# Patient Record
Sex: FEMALE | Race: OTHER | Employment: UNEMPLOYED | URBAN - NONMETROPOLITAN AREA
[De-identification: names, ages, dates, MRNs, and addresses within clinical notes are randomized per-mention and may not be internally consistent; named-entity substitution may affect disease eponyms.]

---

## 2020-02-03 ENCOUNTER — APPOINTMENT (OUTPATIENT)
Dept: CT IMAGING | Age: 55
End: 2020-02-03
Payer: MEDICARE

## 2020-02-03 ENCOUNTER — APPOINTMENT (OUTPATIENT)
Dept: GENERAL RADIOLOGY | Age: 55
End: 2020-02-03
Payer: MEDICARE

## 2020-02-03 ENCOUNTER — HOSPITAL ENCOUNTER (EMERGENCY)
Age: 55
Discharge: ANOTHER HOSPITAL | End: 2020-02-04
Attending: EMERGENCY MEDICINE
Payer: MEDICARE

## 2020-02-03 PROBLEM — G45.9 TIA (TRANSIENT ISCHEMIC ATTACK): Status: ACTIVE | Noted: 2020-02-03

## 2020-02-03 LAB
-: ABNORMAL
ABSOLUTE EOS #: 0.24 K/UL (ref 0–0.44)
ABSOLUTE IMMATURE GRANULOCYTE: 0.05 K/UL (ref 0–0.3)
ABSOLUTE LYMPH #: 4.83 K/UL (ref 1.1–3.7)
ABSOLUTE MONO #: 0.64 K/UL (ref 0.1–1.2)
ALBUMIN SERPL-MCNC: 4.3 G/DL (ref 3.5–5.2)
ALBUMIN/GLOBULIN RATIO: 1.4 (ref 1–2.5)
ALP BLD-CCNC: 96 U/L (ref 35–104)
ALT SERPL-CCNC: 21 U/L (ref 5–33)
AMORPHOUS: ABNORMAL
ANION GAP SERPL CALCULATED.3IONS-SCNC: 16 MMOL/L (ref 9–17)
AST SERPL-CCNC: 15 U/L
BACTERIA: ABNORMAL
BASOPHILS # BLD: 1 % (ref 0–2)
BASOPHILS ABSOLUTE: 0.07 K/UL (ref 0–0.2)
BILIRUB SERPL-MCNC: 0.33 MG/DL (ref 0.3–1.2)
BILIRUBIN URINE: NEGATIVE
BUN BLDV-MCNC: 13 MG/DL (ref 6–20)
BUN/CREAT BLD: 23 (ref 9–20)
CALCIUM SERPL-MCNC: 9.4 MG/DL (ref 8.6–10.4)
CASTS UA: ABNORMAL /LPF (ref 0–2)
CHLORIDE BLD-SCNC: 94 MMOL/L (ref 98–107)
CO2: 22 MMOL/L (ref 20–31)
COLOR: ABNORMAL
COMMENT UA: ABNORMAL
CREAT SERPL-MCNC: 0.56 MG/DL (ref 0.5–0.9)
CRYSTALS, UA: ABNORMAL /HPF
DIFFERENTIAL TYPE: ABNORMAL
EOSINOPHILS RELATIVE PERCENT: 2 % (ref 1–4)
EPITHELIAL CELLS UA: ABNORMAL /HPF (ref 0–5)
GFR AFRICAN AMERICAN: >60 ML/MIN
GFR NON-AFRICAN AMERICAN: >60 ML/MIN
GFR SERPL CREATININE-BSD FRML MDRD: ABNORMAL ML/MIN/{1.73_M2}
GFR SERPL CREATININE-BSD FRML MDRD: ABNORMAL ML/MIN/{1.73_M2}
GLUCOSE BLD-MCNC: 259 MG/DL (ref 70–99)
GLUCOSE URINE: ABNORMAL
HCT VFR BLD CALC: 41.4 % (ref 36.3–47.1)
HEMOGLOBIN: 13.6 G/DL (ref 11.9–15.1)
IMMATURE GRANULOCYTES: 1 %
INR BLD: 1
KETONES, URINE: ABNORMAL
LEUKOCYTE ESTERASE, URINE: NEGATIVE
LYMPHOCYTES # BLD: 48 % (ref 24–43)
MAGNESIUM: 1.6 MG/DL (ref 1.6–2.6)
MCH RBC QN AUTO: 28.5 PG (ref 25.2–33.5)
MCHC RBC AUTO-ENTMCNC: 32.9 G/DL (ref 25.2–33.5)
MCV RBC AUTO: 86.8 FL (ref 82.6–102.9)
MONOCYTES # BLD: 6 % (ref 3–12)
MUCUS: ABNORMAL
NITRITE, URINE: NEGATIVE
NRBC AUTOMATED: 0 PER 100 WBC
OTHER OBSERVATIONS UA: ABNORMAL
PARTIAL THROMBOPLASTIN TIME: 24.5 SEC (ref 27–35)
PDW BLD-RTO: 13 % (ref 11.8–14.4)
PH UA: 5.5 (ref 5–6)
PLATELET # BLD: 239 K/UL (ref 138–453)
PLATELET ESTIMATE: ABNORMAL
PMV BLD AUTO: 11.6 FL (ref 8.1–13.5)
POTASSIUM SERPL-SCNC: 4.4 MMOL/L (ref 3.7–5.3)
PROTEIN UA: NEGATIVE
PROTHROMBIN TIME: 10 SEC (ref 9.4–11.3)
RBC # BLD: 4.77 M/UL (ref 3.95–5.11)
RBC # BLD: ABNORMAL 10*6/UL
RBC UA: ABNORMAL /HPF (ref 0–4)
RENAL EPITHELIAL, UA: ABNORMAL /HPF
SEG NEUTROPHILS: 43 % (ref 36–65)
SEGMENTED NEUTROPHILS ABSOLUTE COUNT: 4.34 K/UL (ref 1.5–8.1)
SODIUM BLD-SCNC: 132 MMOL/L (ref 135–144)
SPECIFIC GRAVITY UA: 1.03 (ref 1.01–1.02)
TOTAL PROTEIN: 7.4 G/DL (ref 6.4–8.3)
TRICHOMONAS: ABNORMAL
TROPONIN INTERP: NORMAL
TROPONIN T: NORMAL NG/ML
TROPONIN, HIGH SENSITIVITY: 7 NG/L (ref 0–14)
TURBIDITY: ABNORMAL
URINE HGB: NEGATIVE
UROBILINOGEN, URINE: NORMAL
WBC # BLD: 10.2 K/UL (ref 3.5–11.3)
WBC # BLD: ABNORMAL 10*3/UL
WBC UA: ABNORMAL /HPF (ref 0–4)
YEAST: ABNORMAL

## 2020-02-03 PROCEDURE — 80053 COMPREHEN METABOLIC PANEL: CPT

## 2020-02-03 PROCEDURE — 84484 ASSAY OF TROPONIN QUANT: CPT

## 2020-02-03 PROCEDURE — 85730 THROMBOPLASTIN TIME PARTIAL: CPT

## 2020-02-03 PROCEDURE — 71046 X-RAY EXAM CHEST 2 VIEWS: CPT

## 2020-02-03 PROCEDURE — 93005 ELECTROCARDIOGRAM TRACING: CPT | Performed by: EMERGENCY MEDICINE

## 2020-02-03 PROCEDURE — 81001 URINALYSIS AUTO W/SCOPE: CPT

## 2020-02-03 PROCEDURE — 85025 COMPLETE CBC W/AUTO DIFF WBC: CPT

## 2020-02-03 PROCEDURE — 87086 URINE CULTURE/COLONY COUNT: CPT

## 2020-02-03 PROCEDURE — 83735 ASSAY OF MAGNESIUM: CPT

## 2020-02-03 PROCEDURE — 70450 CT HEAD/BRAIN W/O DYE: CPT

## 2020-02-03 PROCEDURE — 99285 EMERGENCY DEPT VISIT HI MDM: CPT

## 2020-02-03 PROCEDURE — 2580000003 HC RX 258: Performed by: EMERGENCY MEDICINE

## 2020-02-03 PROCEDURE — 85610 PROTHROMBIN TIME: CPT

## 2020-02-03 RX ORDER — 0.9 % SODIUM CHLORIDE 0.9 %
1000 INTRAVENOUS SOLUTION INTRAVENOUS ONCE
Status: COMPLETED | OUTPATIENT
Start: 2020-02-03 | End: 2020-02-03

## 2020-02-03 RX ADMIN — SODIUM CHLORIDE 1000 ML: 9 INJECTION, SOLUTION INTRAVENOUS at 20:05

## 2020-02-03 SDOH — HEALTH STABILITY: MENTAL HEALTH: HOW OFTEN DO YOU HAVE A DRINK CONTAINING ALCOHOL?: NEVER

## 2020-02-04 ENCOUNTER — HOSPITAL ENCOUNTER (INPATIENT)
Age: 55
LOS: 1 days | Discharge: HOME | DRG: 880 | End: 2020-02-05
Attending: INTERNAL MEDICINE | Admitting: INTERNAL MEDICINE
Payer: MEDICARE

## 2020-02-04 ENCOUNTER — APPOINTMENT (OUTPATIENT)
Dept: CT IMAGING | Age: 55
DRG: 880 | End: 2020-02-04
Attending: INTERNAL MEDICINE
Payer: MEDICARE

## 2020-02-04 VITALS
SYSTOLIC BLOOD PRESSURE: 125 MMHG | BODY MASS INDEX: 35.81 KG/M2 | WEIGHT: 166 LBS | TEMPERATURE: 97.8 F | DIASTOLIC BLOOD PRESSURE: 76 MMHG | RESPIRATION RATE: 16 BRPM | HEART RATE: 93 BPM | HEIGHT: 57 IN | OXYGEN SATURATION: 96 %

## 2020-02-04 LAB
EKG ATRIAL RATE: 60 BPM
EKG P AXIS: 69 DEGREES
EKG P-R INTERVAL: 174 MS
EKG Q-T INTERVAL: 434 MS
EKG QRS DURATION: 140 MS
EKG QTC CALCULATION (BAZETT): 434 MS
EKG R AXIS: -38 DEGREES
EKG T AXIS: 48 DEGREES
EKG VENTRICULAR RATE: 60 BPM
GLUCOSE BLD-MCNC: 239 MG/DL (ref 65–105)
GLUCOSE BLD-MCNC: 299 MG/DL (ref 65–105)
GLUCOSE BLD-MCNC: 347 MG/DL (ref 65–105)

## 2020-02-04 PROCEDURE — 70498 CT ANGIOGRAPHY NECK: CPT

## 2020-02-04 PROCEDURE — 2060000000 HC ICU INTERMEDIATE R&B

## 2020-02-04 PROCEDURE — 6370000000 HC RX 637 (ALT 250 FOR IP): Performed by: INTERNAL MEDICINE

## 2020-02-04 PROCEDURE — 6360000004 HC RX CONTRAST MEDICATION: Performed by: INTERNAL MEDICINE

## 2020-02-04 PROCEDURE — 6370000000 HC RX 637 (ALT 250 FOR IP): Performed by: NURSE PRACTITIONER

## 2020-02-04 PROCEDURE — 92523 SPEECH SOUND LANG COMPREHEN: CPT

## 2020-02-04 PROCEDURE — 99232 SBSQ HOSP IP/OBS MODERATE 35: CPT | Performed by: INTERNAL MEDICINE

## 2020-02-04 PROCEDURE — 82947 ASSAY GLUCOSE BLOOD QUANT: CPT

## 2020-02-04 PROCEDURE — 97161 PT EVAL LOW COMPLEX 20 MIN: CPT

## 2020-02-04 PROCEDURE — 2580000003 HC RX 258: Performed by: NURSE PRACTITIONER

## 2020-02-04 PROCEDURE — 95819 EEG AWAKE AND ASLEEP: CPT | Performed by: PSYCHIATRY & NEUROLOGY

## 2020-02-04 PROCEDURE — 6360000002 HC RX W HCPCS: Performed by: NURSE PRACTITIONER

## 2020-02-04 PROCEDURE — 99222 1ST HOSP IP/OBS MODERATE 55: CPT

## 2020-02-04 PROCEDURE — 97530 THERAPEUTIC ACTIVITIES: CPT

## 2020-02-04 PROCEDURE — 95816 EEG AWAKE AND DROWSY: CPT

## 2020-02-04 RX ORDER — SODIUM CHLORIDE 0.9 % (FLUSH) 0.9 %
10 SYRINGE (ML) INJECTION EVERY 12 HOURS SCHEDULED
Status: DISCONTINUED | OUTPATIENT
Start: 2020-02-04 | End: 2020-02-05 | Stop reason: HOSPADM

## 2020-02-04 RX ORDER — ASPIRIN 325 MG
TABLET ORAL
COMMUNITY
Start: 2012-10-03

## 2020-02-04 RX ORDER — ONDANSETRON 2 MG/ML
4 INJECTION INTRAMUSCULAR; INTRAVENOUS EVERY 6 HOURS PRN
Status: DISCONTINUED | OUTPATIENT
Start: 2020-02-04 | End: 2020-02-05 | Stop reason: HOSPADM

## 2020-02-04 RX ORDER — FUROSEMIDE 80 MG
1 TABLET ORAL DAILY
COMMUNITY
Start: 2012-10-03

## 2020-02-04 RX ORDER — MONTELUKAST SODIUM 10 MG/1
10 TABLET ORAL NIGHTLY
COMMUNITY

## 2020-02-04 RX ORDER — DIGOXIN 125 MCG
1 TABLET ORAL DAILY
Status: ON HOLD | COMMUNITY
Start: 2012-10-03 | End: 2020-02-04

## 2020-02-04 RX ORDER — CALCIUM CARBONATE/VITAMIN D3 500-10/5ML
LIQUID (ML) ORAL 2 TIMES DAILY
COMMUNITY
Start: 2016-08-08

## 2020-02-04 RX ORDER — RANOLAZINE 500 MG/1
500 TABLET, EXTENDED RELEASE ORAL 2 TIMES DAILY
COMMUNITY
Start: 2019-10-11

## 2020-02-04 RX ORDER — ISOSORBIDE MONONITRATE 120 MG/1
120 TABLET, EXTENDED RELEASE ORAL DAILY
COMMUNITY
Start: 2019-10-11

## 2020-02-04 RX ORDER — ALBUTEROL SULFATE 90 UG/1
2 AEROSOL, METERED RESPIRATORY (INHALATION) DAILY PRN
COMMUNITY
Start: 2018-04-23

## 2020-02-04 RX ORDER — GABAPENTIN 300 MG/1
300 CAPSULE ORAL DAILY
COMMUNITY
Start: 2016-08-08

## 2020-02-04 RX ORDER — KETOCONAZOLE 20 MG/G
CREAM TOPICAL 2 TIMES DAILY
COMMUNITY
Start: 2019-04-03

## 2020-02-04 RX ORDER — ROPINIROLE 0.25 MG/1
0.25 TABLET, FILM COATED ORAL NIGHTLY
COMMUNITY
Start: 2016-08-08

## 2020-02-04 RX ORDER — GLIPIZIDE 10 MG/1
1 TABLET, FILM COATED, EXTENDED RELEASE ORAL DAILY
COMMUNITY
Start: 2019-04-01

## 2020-02-04 RX ORDER — ALBUTEROL SULFATE 2.5 MG/3ML
2.5 SOLUTION RESPIRATORY (INHALATION) EVERY 4 HOURS
COMMUNITY
Start: 2012-10-03

## 2020-02-04 RX ORDER — SODIUM CHLORIDE 0.9 % (FLUSH) 0.9 %
10 SYRINGE (ML) INJECTION PRN
Status: DISCONTINUED | OUTPATIENT
Start: 2020-02-04 | End: 2020-02-05 | Stop reason: HOSPADM

## 2020-02-04 RX ORDER — ATORVASTATIN CALCIUM 40 MG/1
40 TABLET, FILM COATED ORAL NIGHTLY
Status: DISCONTINUED | OUTPATIENT
Start: 2020-02-04 | End: 2020-02-05 | Stop reason: HOSPADM

## 2020-02-04 RX ORDER — ACETAMINOPHEN 325 MG/1
650 TABLET ORAL EVERY 4 HOURS PRN
Status: DISCONTINUED | OUTPATIENT
Start: 2020-02-04 | End: 2020-02-05 | Stop reason: HOSPADM

## 2020-02-04 RX ORDER — ERGOCALCIFEROL 1.25 MG/1
1 CAPSULE ORAL
COMMUNITY
Start: 2019-02-15

## 2020-02-04 RX ORDER — POTASSIUM CHLORIDE 20 MEQ/1
20 TABLET, EXTENDED RELEASE ORAL DAILY
COMMUNITY
Start: 2019-10-11

## 2020-02-04 RX ORDER — NITROGLYCERIN 0.4 MG/1
0.4 TABLET SUBLINGUAL EVERY 5 MIN PRN
COMMUNITY
Start: 2012-10-03

## 2020-02-04 RX ORDER — DIGOXIN 250 MCG
1 TABLET ORAL DAILY
COMMUNITY
Start: 2019-04-01

## 2020-02-04 RX ORDER — NICOTINE POLACRILEX 4 MG
15 LOZENGE BUCCAL PRN
Status: DISCONTINUED | OUTPATIENT
Start: 2020-02-04 | End: 2020-02-05 | Stop reason: HOSPADM

## 2020-02-04 RX ORDER — CLOPIDOGREL BISULFATE 75 MG/1
75 TABLET ORAL DAILY
Status: DISCONTINUED | OUTPATIENT
Start: 2020-02-04 | End: 2020-02-05 | Stop reason: HOSPADM

## 2020-02-04 RX ORDER — RANOLAZINE 500 MG/1
500 TABLET, EXTENDED RELEASE ORAL 2 TIMES DAILY
Status: DISCONTINUED | OUTPATIENT
Start: 2020-02-04 | End: 2020-02-05 | Stop reason: HOSPADM

## 2020-02-04 RX ORDER — OMEPRAZOLE 40 MG/1
40 CAPSULE, DELAYED RELEASE ORAL 2 TIMES DAILY
COMMUNITY
Start: 2019-01-09

## 2020-02-04 RX ORDER — DEXTROSE MONOHYDRATE 50 MG/ML
100 INJECTION, SOLUTION INTRAVENOUS PRN
Status: DISCONTINUED | OUTPATIENT
Start: 2020-02-04 | End: 2020-02-05 | Stop reason: HOSPADM

## 2020-02-04 RX ORDER — DEXTROSE MONOHYDRATE 25 G/50ML
12.5 INJECTION, SOLUTION INTRAVENOUS PRN
Status: DISCONTINUED | OUTPATIENT
Start: 2020-02-04 | End: 2020-02-05 | Stop reason: HOSPADM

## 2020-02-04 RX ADMIN — INSULIN LISPRO 8 UNITS: 100 INJECTION, SOLUTION INTRAVENOUS; SUBCUTANEOUS at 15:44

## 2020-02-04 RX ADMIN — DESMOPRESSIN ACETATE 40 MG: 0.2 TABLET ORAL at 21:05

## 2020-02-04 RX ADMIN — IOHEXOL 90 ML: 350 INJECTION, SOLUTION INTRAVENOUS at 17:36

## 2020-02-04 RX ADMIN — CLOPIDOGREL 75 MG: 75 TABLET, FILM COATED ORAL at 13:31

## 2020-02-04 RX ADMIN — ENOXAPARIN SODIUM 40 MG: 40 INJECTION SUBCUTANEOUS at 08:28

## 2020-02-04 RX ADMIN — INSULIN LISPRO 2 UNITS: 100 INJECTION, SOLUTION INTRAVENOUS; SUBCUTANEOUS at 22:21

## 2020-02-04 RX ADMIN — ACETAMINOPHEN 650 MG: 325 TABLET ORAL at 15:23

## 2020-02-04 RX ADMIN — RANOLAZINE 500 MG: 500 TABLET, FILM COATED, EXTENDED RELEASE ORAL at 22:20

## 2020-02-04 RX ADMIN — ACETAMINOPHEN 650 MG: 325 TABLET ORAL at 21:10

## 2020-02-04 RX ADMIN — Medication 10 ML: at 08:29

## 2020-02-04 RX ADMIN — Medication 10 ML: at 21:04

## 2020-02-04 ASSESSMENT — PAIN SCALES - GENERAL
PAINLEVEL_OUTOF10: 2
PAINLEVEL_OUTOF10: 5
PAINLEVEL_OUTOF10: 2
PAINLEVEL_OUTOF10: 7
PAINLEVEL_OUTOF10: 3

## 2020-02-04 ASSESSMENT — PAIN DESCRIPTION - FREQUENCY: FREQUENCY: INTERMITTENT

## 2020-02-04 ASSESSMENT — PAIN DESCRIPTION - LOCATION
LOCATION: CHEST
LOCATION: CHEST
LOCATION: CHEST;RIB CAGE
LOCATION: HEAD

## 2020-02-04 ASSESSMENT — PAIN DESCRIPTION - ORIENTATION
ORIENTATION: ANTERIOR
ORIENTATION: LEFT

## 2020-02-04 ASSESSMENT — PAIN DESCRIPTION - ONSET: ONSET: GRADUAL

## 2020-02-04 ASSESSMENT — PAIN DESCRIPTION - PROGRESSION
CLINICAL_PROGRESSION: GRADUALLY IMPROVING
CLINICAL_PROGRESSION: NOT CHANGED

## 2020-02-04 ASSESSMENT — PAIN DESCRIPTION - PAIN TYPE: TYPE: ACUTE PAIN

## 2020-02-04 ASSESSMENT — PAIN DESCRIPTION - DESCRIPTORS: DESCRIPTORS: ACHING

## 2020-02-04 NOTE — H&P
(H) 70 - 99 mg/dL    BUN 13 6 - 20 mg/dL    CREATININE 0.56 0.50 - 0.90 mg/dL    Bun/Cre Ratio 23 (H) 9 - 20    Calcium 9.4 8.6 - 10.4 mg/dL    Sodium 132 (L) 135 - 144 mmol/L    Potassium 4.4 3.7 - 5.3 mmol/L    Chloride 94 (L) 98 - 107 mmol/L    CO2 22 20 - 31 mmol/L    Anion Gap 16 9 - 17 mmol/L    Alkaline Phosphatase 96 35 - 104 U/L    ALT 21 5 - 33 U/L    AST 15 <32 U/L    Total Bilirubin 0.33 0.3 - 1.2 mg/dL    Total Protein 7.4 6.4 - 8.3 g/dL    Alb 4.3 3.5 - 5.2 g/dL    Albumin/Globulin Ratio 1.4 1.0 - 2.5    GFR Non-African American >60 >60 mL/min    GFR African American >60 >60 mL/min    GFR Comment          GFR Staging NOT REPORTED    CBC Auto Differential    Collection Time: 02/03/20  7:23 PM   Result Value Ref Range    WBC 10.2 3.5 - 11.3 k/uL    RBC 4.77 3.95 - 5.11 m/uL    Hemoglobin 13.6 11.9 - 15.1 g/dL    Hematocrit 41.4 36.3 - 47.1 %    MCV 86.8 82.6 - 102.9 fL    MCH 28.5 25.2 - 33.5 pg    MCHC 32.9 25.2 - 33.5 g/dL    RDW 13.0 11.8 - 14.4 %    Platelets 671 566 - 772 k/uL    MPV 11.6 8.1 - 13.5 fL    NRBC Automated 0.0 0.0 per 100 WBC    Differential Type NOT REPORTED     WBC Morphology NOT REPORTED     RBC Morphology NOT REPORTED     Platelet Estimate NOT REPORTED     Seg Neutrophils 43 36 - 65 %    Lymphocytes 48 (H) 24 - 43 %    Monocytes 6 3 - 12 %    Eosinophils % 2 1 - 4 %    Basophils 1 0 - 2 %    Immature Granulocytes 1 (H) 0 %    Segs Absolute 4.34 1.50 - 8.10 k/uL    Absolute Lymph # 4.83 (H) 1.10 - 3.70 k/uL    Absolute Mono # 0.64 0.10 - 1.20 k/uL    Absolute Eos # 0.24 0.00 - 0.44 k/uL    Basophils Absolute 0.07 0.00 - 0.20 k/uL    Absolute Immature Granulocyte 0.05 0.00 - 0.30 k/uL   Troponin    Collection Time: 02/03/20  7:23 PM   Result Value Ref Range    Troponin, High Sensitivity 7 0 - 14 ng/L    Troponin T NOT REPORTED <0.03 ng/mL    Troponin Interp NOT REPORTED    Magnesium    Collection Time: 02/03/20  7:23 PM   Result Value Ref Range    Magnesium 1.6 1.6 - 2.6 mg/dL Protime-INR    Collection Time: 02/03/20  7:23 PM   Result Value Ref Range    Protime 10.0 9.4 - 11.3 sec    INR 1.0    APTT    Collection Time: 02/03/20  7:23 PM   Result Value Ref Range    PTT 24.5 (L) 27.0 - 35.0 sec   Urinalysis with Microscopic    Collection Time: 02/03/20  8:00 PM   Result Value Ref Range    Color, UA NOT REPORTED YELLOW    Turbidity UA NOT REPORTED CLEAR    Glucose, Ur 3+ (A) NEGATIVE    Bilirubin Urine NEGATIVE NEGATIVE    Ketones, Urine TRACE (A) NEGATIVE    Specific Gravity, UA 1.030 (H) 1.010 - 1.025    Urine Hgb NEGATIVE NEGATIVE    pH, UA 5.5 5.0 - 6.0    Protein, UA NEGATIVE NEGATIVE    Urobilinogen, Urine Normal Normal    Nitrite, Urine NEGATIVE NEGATIVE    Leukocyte Esterase, Urine NEGATIVE NEGATIVE    Urinalysis Comments NOT REPORTED     -          WBC, UA 5 TO 10 0 - 4 /HPF    RBC, UA 0 TO 4 0 - 4 /HPF    Casts UA NOT REPORTED 0 - 2 /LPF    Crystals UA NOT REPORTED None /HPF    Epithelial Cells UA 5 TO 10 0 - 5 /HPF    Renal Epithelial, Urine NOT REPORTED 0 /HPF    Bacteria, UA 1+ (A) None    Mucus, UA 1+ (A) None    Trichomonas, UA NOT REPORTED None    Amorphous, UA NOT REPORTED None    Other Observations UA NOT REPORTED NOT REQ. Yeast, UA NOT REPORTED None   POC Glucose Fingerstick    Collection Time: 02/04/20  8:11 AM   Result Value Ref Range    POC Glucose 299 (H) 65 - 105 mg/dL       Imaging/Diagnostics:  Xr Chest Standard (2 Vw)    Result Date: 2/3/2020  Indwelling pacemaker. Otherwise unremarkable study. No evidence of acute cardiopulmonary process. Ct Head Wo Contrast    Result Date: 2/3/2020  No acute intracranial abnormality.        Assessment :      Hospital Problems           Last Modified POA    TIA (transient ischemic attack) 2/3/2020 Yes          Plan:     Patient status observation in the Med/Surge    Difficulty finding words intermittent confusion rule out seizure versus TIA give Plavix statin neurology consult patient has similar episodes in the past

## 2020-02-04 NOTE — CARE COORDINATION
Case Management Initial Discharge Plan  Pratik [address] [place] [address] with:patient to discuss discharge plans. Information verified: address, contacts, phone number, , insurance Yes  PCP: No primary care provider on file. Date of last visit: Make appt with [facility] clinic    Insurance Provider: Medicare/Medicaid    Discharge Planning    Living Arrangements:  Children, Family Members   Support Systems:  Family Members, [id number] [address] has 1 story  3 stairs to climb to get into front door, 0 stairs to climb to reach second floor  Location of bedroom/bathroom in home main    Patient able to perform ADL'sIndependent    Current Services (outpatient & in home) none  DME equipment: glucometer  DME provider:  Medical      Potential Assistance Needed:  N/A    Patient agreeable to home care: No  [place] of choice provided:  n/a    Prior SNF/Rehab Placement and Facility: none  Agreeable to SNF/Rehab: No  [place] of choice provided: n/a   Evaluation: no    Expected Discharge date:  20  Patient expects to be discharged to:  home  Follow Up Appointment: Best Day/ Time:      Transportation provider: daughter  Transportation arrangements needed for discharge: No    Readmission Risk              Risk of Unplanned Readmission:        10             Does patient have a readmission risk score greater than 14?: No  If yes, follow-up appointment must be made within 7 days of discharge. Goals of Care: Return home neurologically intact      Discharge Plan: home    [facility] [phone] and told them her meter broke. [employer] will only pay every 5 years but she can call the  [phone] to troublshoot and given to pt.  Make new PCP appt [facility]          Electronically signed by Sandy Stanton RN on 20 at 5:27 PM

## 2020-02-04 NOTE — PROGRESS NOTES
Pt stated that she was \"shaky\"   Hx of diabetes  Performed POC Blood Glucose and received reading of 299 mg/dL.   Nurse notified

## 2020-02-04 NOTE — PROGRESS NOTES
Speech Language Pathology  Facility/Department: [facility] NEURO  Initial Speech/Language/Cognitive Assessment    NAME: Debbie Jackson  : 1965   MRN: [id number]  ADMISSION DATE: 2020  ADMITTING DIAGNOSIS: has TIA (transient ischemic attack) on their problem list.    Date of Eval: 2020   Evaluating Therapist: Francisco J Garay,  Clinician       Primary Complaint:    Debbie Jackson is a 48 y.o. female who presents with intermittent confusion for the past 2 to 3 days. Has been intermittently having nonsensical answers to questions per her daughter. Reportedly she has had 5 similar presentations that were diagnosed as TIAs. Last one was about 4 years ago. Patient also reports of intermittent chest discomfort. No dyspnea. Patient does have a history of chest pain and states it feels similar. Has been having some intermittent tremors as well. Denies any fevers but does report some chills. Patient afebrile here. Denies any other infectious symptoms. Denies any urinary symptoms. Denies any focal neurologic deficits such as numbness/weakness/paresthesias. Denies any vision changes. Denies any trauma. No neck or back pain or stiffness. Denies any other symptoms at this time.       Pain:  Pain Assessment  Pain Assessment: 0-10  Pain Level: 0    Assessment:    Pt presents with mild to moderate cognitive deficits characterized by impaired immediate and short-term recall, abstract and deductive reasoning. Pt reported that family members had noticed decreased alertness and memory difficulties prior to hospitalization. Pt observed with longer processing and frequently requested repetitions of directions. Pt. Presents with no dysarthria, no O/M deficits at this time. ST to follow up and provide treatment to address noted deficits. Education provided.         Recommendations:  Requires SLP Intervention: Yes  Duration/Frequency of Treatment: 3-5x per week  D/C Recommendations: Further therapy recommended at discharge. Plan:   Goals:  Short-term Goals  Goal 1: Pt will recall 3-5 units of information with and without distractions with 90% accuracy  Goal 2: Pt will utilize memory compensatory strategies to aid in recall  Goal 3: Pt will complete abstract and deductive reasoning tasks with 90% accuracy  Goal 4: Pt will generate 6-8 members of an abstract category with 90% accuracy   Patient/family involved in developing goals and treatment plan: yes    Subjective:  General  Chart Reviewed: Yes  Family / Caregiver Present: No  Social/Functional History  Lives With: Daughter; Other (comment)(sister, nephew, daughter)  Active : Yes  Vision  Vision: Within Functional Limits  Hearing  Hearing: Within functional limits           Objective:     Oral/Motor  Oral Motor: Within functional limits    Auditory Comprehension  Comprehension: Within Functional Limits         Expression  Primary Mode of Expression: Verbal    Verbal Expression  Verbal Expression: Within functional limits         Motor Speech  Motor Speech: Within Functional Limits         Cognition:      Orientation  Overall Orientation Status: Within Functional Limits  Attention  Attention: Within Functional Limits  Memory  Memory: Exceptions to [facility]  Short-term Memory: Moderate(0/3, 1/3)  Immediate Memory: Mild(3/3, 4/5, 3/3)  Problem Solving  Problem Solving: Exceptions to [facility]  Verbal Reasoning Skills: Mild(Antonyms: 2/3, Homographs: 5/6, Inductive Reasoning: 3/4, )  Safety/Judgement  S[facility]Judgement: [facility]  Flexibility of Thought: Mild(4/6)  Word Associations:  Moderate (2/4)  Verbal Sequencing: Within Functional Limits  Word Generation: Mild (+2 abstract category)    Prognosis:  Speech Therapy Prognosis  Prognosis: Fair  Individuals consulted  Consulted and agree with results and recommendations: Patient    Education:  Patient Education: yes  Patient Education Response: Verbalizes understanding Therapy Time:   Individual Concurrent Group Co-treatment   Time In 3649         Time Out 1055         Minutes 14               Completed by: Ursula Gilford,  Clinician    Cosigned By: Misael BOND CCC/SLP    2/4/2020 11:59 AM

## 2020-02-04 NOTE — PROGRESS NOTES
Physical Therapy    Facility/Department: [facility] NEURO  Initial Assessment    NAME: Parish Caldwell  : 1965  MRN: [id number]    Date of Service: 2020    Discharge Recommendations:    No therapy recommended at discharge. Assessment   Assessment: Pt is independent in both med mobility and transfers. Amb 350 ft without AD steady without evidence of balance concerns. Strength equal in UE/LE bilaterally. Pt reports she feels she is at her baseline in terms of balance and mobility without any concerns. No acute PT needs at this time. Prognosis: Good  Decision Making: Low Complexity  PT Education: PT Role;General Safety;Gait Training  REQUIRES PT FOLLOW UP: No  Activity Tolerance  Activity Tolerance: Patient Tolerated treatment well       Patient Diagnosis(es): There were no encounter diagnoses. has a past medical history of Asthma, Atrial fibrillation ([facility] [address] Cerebral artery occlusion with cerebral infarction [facility]), CHF (congestive heart failure) ([facility] [address] [facility] Hyperlipidemia, and Hypertension. has a past surgical history that includes Cardiac surgery. Restrictions  Restrictions/Precautions  Restrictions/Precautions: Up as Tolerated, General Precautions  Required Braces or Orthoses?: No  Vision/Hearing  Vision: Impaired  Vision Exceptions: Wears glasses at all times  Hearing: Within functional limits     Subjective  General  Chart Reviewed: Yes  Patient assessed for rehabilitation services?: Yes  Response To Previous Treatment: Not applicable  Family / Caregiver Present: No  Follows Commands: Within Functional Limits  Subjective  Subjective: RN and pt agreeable to therapy. Pt is very pleasent and found resting supine in bed.    Pain Screening  Patient Currently in Pain: Yes  Pain Assessment  Pain Assessment: 0-10  Pain Level: 2  Pain Location: Chest;Rib cage(under breast)  Pain Orientation: Left  Non-Pharmaceutical Pain Intervention(s): Ambulation/Increased Activity; Therapeutic presence  Response to Pain Intervention: Patient Satisfied  Vital Signs  Patient Currently in Pain: Yes  Pre Treatment Pain Screening  Intervention List: Patient able to continue with treatment    Orientation  Orientation  Overall Orientation Status: Within Functional Limits  Social/Functional History  Social/Functional History  Lives With: Daughter, Other (comment)(nephew and sister)  Type of Home: House  Home Layout: One level  Home Access: Stairs to enter without rails  Entrance Stairs - Number of Steps: 3 CHLOE  Bathroom Shower/Tub: Walk-in shower  Bathroom Equipment: Grab bars in shower, Grab bars around toilet  Home Equipment: (no DME)  ADL Assistance: Independent  Homemaking Assistance: Independent  Homemaking Responsibilities: Yes  Ambulation Assistance: Independent  Transfer Assistance: Independent  Active : Yes  Occupation: On disability  Additional Comments: 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  Cognition   Cognition  Overall Cognitive Status: WFL    Objective          AROM RLE (degrees)  RLE AROM: WFL  AROM LLE (degrees)  LLE AROM : WFL  AROM RUE (degrees)  RUE AROM : WFL  AROM LUE (degrees)  LUE AROM : WFL  Strength RLE  Strength RLE: WFL  Strength LLE  Strength LLE: WFL  Strength RUE  Strength RUE: WFL  Strength LUE  Strength LUE: WFL     Sensation  Overall Sensation Status: WFL(denies numbness and tingling)  Bed mobility  Supine to Sit: Independent  Sit to Supine: Independent  Comment: bed positioned flat to simulate home setting  Transfers  Sit to Stand: Modified independent  Stand to sit: Modified independent  Comment: steady upon initial rising with no evidence for imbalance  Ambulation  Ambulation?: Yes  More Ambulation?: No  Ambulation 1  Surface: level tile  Device: No Device  Assistance: Modified Independent  Distance: 275 ft  Comments: pt has adequate gait speed and linear path with gait, equal step length and no evidence of imbalance  Stairs/Curb  Stairs?: No     Balance  Sitting - Static: Good  Sitting - Dynamic: Good  Standing - Static: Good  Standing - Dynamic: Good  Comments: standing balance assessed without AD      AM-PAC Score     AM-PAC Inpatient Mobility without Stair Climbing Raw Score : 20 (02/04/20 0852)  AM-PAC Inpatient without Stair Climbing T-Scale Score : 60.57 (02/04/20 0852)  Mobility Inpatient CMS 0-100% Score: 0 (02/04/20 0852)  Mobility Inpatient without Stair CMS G-Code Modifier : [facility] (02/04/20 7337)       Therapy Time   Individual Concurrent Group Co-treatment   Time In 0841         Time Out 0901         Minutes 20         Timed Code Treatment Minutes: 8 Minutes     PT to sign off at this time. Dawson Degroot   This treatment/evaluation completed by signing SPT. Signing PT agrees with treatment and documentation.

## 2020-02-04 NOTE — PROGRESS NOTES
Occupational Therapy    Occupational Therapy Not Seen Note    DATE: 2020  Name: Cielo Guevara  : 1965  MRN: [id number]    Patient not available for Occupational Therapy due to:    Pt independent with functional mobility and functional tasks.  Pt with no OT acute care needs at this time, will defer OT eval.    Next Scheduled Treatment: N/A    Electronically signed by Elian Hickman OT on 2020 at 10:41 AM

## 2020-02-04 NOTE — ED PROVIDER NOTES
[facility] ED  [address]  [place] [address]  Phone: [phone]    Naomi          Pt Name: Boris Kinney  MRN: [id number]  Vianeygfmarisel 1965  Date of evaluation: 2/3/2020      CHIEF COMPLAINT       Chief Complaint   Patient presents with    Shaking    Other     daugher states she has been making simple mistakes and not realizing it     Numbness     forehead        HISTORY OF PRESENT ILLNESS       Boris Kinney is a 48 y.o. female who presents with intermittent confusion for the past 2 to 3 days. Has been intermittently having nonsensical answers to questions per her daughter. Reportedly she has had 5 similar presentations that were diagnosed as TIAs. Last one was about 4 years ago. Patient also reports of intermittent chest discomfort. No dyspnea. Patient does have a history of chest pain and states it feels similar. Has been having some intermittent tremors as well. Denies any fevers but does report some chills. Patient afebrile here. Denies any other infectious symptoms. Denies any urinary symptoms. Denies any focal neurologic deficits such as numbness/weakness/paresthesias. Denies any vision changes. Denies any trauma. No neck or back pain or stiffness. Denies any other symptoms at this time. REVIEW OF SYSTEMS       Review of Systems       PAST MEDICAL HISTORY    has a past medical history of Asthma, Atrial fibrillation ([address] Cerebral artery occlusion with cerebral infarction ([address] CHF (congestive heart failure) ([address] Diabetes mellitus ([address] Hyperlipidemia, and Hypertension. SURGICAL HISTORY      has a past surgical history that includes Cardiac surgery. CURRENT MEDICATIONS       Previous Medications    No medications on file       ALLERGIES     is allergic to aggrenox [aspirin-dipyridamole er] and keflex [cephalexin]. FAMILY HISTORY     has no family status information on file.       family history is not cardiologist.  Interpreted by Jenni Garcia DO     Rhythm: Paced rhythm  Rate: 60  Axis: Left  Ectopy: None  Conduction: Paced rhythm  ST Segments: No acute elevation or depression  T Waves: Nonspecific findings    Clinical Impression: Nonspecific ECG. Paced rhythm. No acute infarction/ischemia noted. RADIOLOGY:   I directly visualized the following  images and reviewed the radiologist interpretations:  XR CHEST STANDARD (2 VW)   Final Result   Indwelling pacemaker. Otherwise unremarkable study. No evidence of acute   cardiopulmonary process. CT Head WO Contrast   Final Result   No acute intracranial abnormality. Xr Chest Standard (2 Vw)    Result Date: 2/3/2020  EXAMINATION: TWO XRAY VIEWS OF THE CHEST 2/3/2020 7:59 pm COMPARISON: None. HISTORY: ORDERING SYSTEM PROVIDED HISTORY: Cough TECHNOLOGIST PROVIDED HISTORY: Cough Reason for Exam: Chest pain; confusion; shaky Acuity: Acute Type of Exam: Initial FINDINGS: Overlying cardiac monitoring electrodes are present. Pacemaker electrodes enter from the left with intact lead in appropriate position. Heart size is normal.  No vascular congestion, focal consolidation, effusion, or pneumothorax is noted. Osseous and mediastinal structures are age-appropriate. Indwelling pacemaker. Otherwise unremarkable study. No evidence of acute cardiopulmonary process. Ct Head Wo Contrast    Result Date: 2/3/2020  EXAMINATION: CT OF THE HEAD WITHOUT CONTRAST  2/3/2020 7:44 pm TECHNIQUE: CT of the head was performed without the administration of intravenous contrast. Dose modulation, iterative reconstruction, and/or weight based adjustment of the mA/kV was utilized to reduce the radiation dose to as low as reasonably achievable. COMPARISON: None.  HISTORY: ORDERING SYSTEM PROVIDED HISTORY: confusion TECHNOLOGIST PROVIDED HISTORY: confusion Is the patient pregnant?->No Reason for Exam: Confusion, shakiness, hx of cva Acuity: Acute Type of Exam: Initial FINDINGS: BRAIN/VENTRICLES: There is no acute intracranial hemorrhage, mass effect or midline shift. No abnormal extra-axial fluid collection. The gray-white differentiation is maintained without evidence of an acute infarct. There is no evidence of hydrocephalus. ORBITS: The visualized portion of the orbits demonstrate no acute abnormality. SINUSES: The visualized paranasal sinuses and mastoid air cells demonstrate no acute abnormality. SOFT TISSUES/SKULL:  No acute abnormality of the visualized skull or soft tissues. No acute intracranial abnormality.        LABS:  Results for orders placed or performed during the hospital encounter of 02/03/20   Comprehensive Metabolic Panel   Result Value Ref Range    Glucose 259 (H) 70 - 99 mg/dL    BUN 13 6 - 20 mg/dL    CREATININE 0.56 0.50 - 0.90 mg/dL    Bun/Cre Ratio 23 (H) 9 - 20    Calcium 9.4 8.6 - 10.4 mg/dL    Sodium 132 (L) 135 - 144 mmol/L    Potassium 4.4 3.7 - 5.3 mmol/L    Chloride 94 (L) 98 - 107 mmol/L    CO2 22 20 - 31 mmol/L    Anion Gap 16 9 - 17 mmol/L    Alkaline Phosphatase 96 35 - 104 U/L    ALT 21 5 - 33 U/L    AST 15 <32 U/L    Total Bilirubin 0.33 0.3 - 1.2 mg/dL    Total Protein 7.4 6.4 - 8.3 g/dL    Alb 4.3 3.5 - 5.2 g/dL    Albumin/Globulin Ratio 1.4 1.0 - 2.5    GFR Non-African American >60 >60 mL/min    GFR African American >60 >60 mL/min    GFR Comment          GFR Staging NOT REPORTED    CBC Auto Differential   Result Value Ref Range    WBC 10.2 3.5 - 11.3 k/uL    RBC 4.77 3.95 - 5.11 m/uL    Hemoglobin 13.6 11.9 - 15.1 g/dL    Hematocrit 41.4 36.3 - 47.1 %    MCV 86.8 82.6 - 102.9 fL    MCH 28.5 25.2 - 33.5 pg    MCHC 32.9 25.2 - 33.5 g/dL    RDW 13.0 11.8 - 14.4 %    Platelets 161 454 - 086 k/uL    MPV 11.6 8.1 - 13.5 fL    NRBC Automated 0.0 0.0 per 100 WBC    Differential Type NOT REPORTED     WBC Morphology NOT REPORTED     RBC Morphology NOT REPORTED     Platelet Estimate NOT REPORTED     Seg Neutrophils 43 36 - 65 % Lymphocytes 48 (H) 24 - 43 %    Monocytes 6 3 - 12 %    Eosinophils % 2 1 - 4 %    Basophils 1 0 - 2 %    Immature Granulocytes 1 (H) 0 %    Segs Absolute 4.34 1.50 - 8.10 k/uL    Absolute Lymph # 4.83 (H) 1.10 - 3.70 k/uL    Absolute Mono # 0.64 0.10 - 1.20 k/uL    Absolute Eos # 0.24 0.00 - 0.44 k/uL    Basophils Absolute 0.07 0.00 - 0.20 k/uL    Absolute Immature Granulocyte 0.05 0.00 - 0.30 k/uL   Troponin   Result Value Ref Range    Troponin, High Sensitivity 7 0 - 14 ng/L    Troponin T NOT REPORTED <0.03 ng/mL    Troponin Interp NOT REPORTED    Magnesium   Result Value Ref Range    Magnesium 1.6 1.6 - 2.6 mg/dL   Protime-INR   Result Value Ref Range    Protime 10.0 9.4 - 11.3 sec    INR 1.0    APTT   Result Value Ref Range    PTT 24.5 (L) 27.0 - 35.0 sec   Urinalysis with Microscopic   Result Value Ref Range    Color, UA NOT REPORTED YELLOW    Turbidity UA NOT REPORTED CLEAR    Glucose, Ur 3+ (A) NEGATIVE    Bilirubin Urine NEGATIVE NEGATIVE    Ketones, Urine TRACE (A) NEGATIVE    Specific Gravity, UA 1.030 (H) 1.010 - 1.025    Urine Hgb NEGATIVE NEGATIVE    pH, UA 5.5 5.0 - 6.0    Protein, UA NEGATIVE NEGATIVE    Urobilinogen, Urine Normal Normal    Nitrite, Urine NEGATIVE NEGATIVE    Leukocyte Esterase, Urine NEGATIVE NEGATIVE    Urinalysis Comments NOT REPORTED     -          WBC, UA 5 TO 10 0 - 4 /HPF    RBC, UA 0 TO 4 0 - 4 /HPF    Casts UA NOT REPORTED 0 - 2 /LPF    Crystals UA NOT REPORTED None /HPF    Epithelial Cells UA 5 TO 10 0 - 5 /HPF    Renal Epithelial, Urine NOT REPORTED 0 /HPF    Bacteria, UA 1+ (A) None    Mucus, UA 1+ (A) None    Trichomonas, UA NOT REPORTED None    Amorphous, UA NOT REPORTED None    Other Observations UA NOT REPORTED NOT REQ.     Yeast, UA NOT REPORTED None       EMERGENCY DEPARTMENT COURSE:     The patient was given the following medications:  Orders Placed This Encounter   Medications    0.9 % sodium chloride bolus        Vitals:    Vitals:    02/03/20 2206 02/03/20 2235 02/03/20 2305 02/04/20 0005   BP: (!) 118/58 125/61 (!) 111/55 (!) 120/58   Pulse: 60 60 60 60   Resp: 14 14 16 16   Temp:       TempSrc:       SpO2: 97% 96% 97% 96%   Weight:       Height:         -------------------------  BP: (!) 120/58, Temp: 97.8 °F (36.6 °C), Pulse: 60, Resp: 16      Re-evaluation Notes    ED Course as of Feb 04 0037   Mon Feb 03, 2020   2143 Patient doing well on reevaluation. Remains asymptomatic from a neurologic perspective. Work-up so far is unremarkable. Mild hyponatremia. She is getting IV fluids that is normal saline. CT of the head shows no acute findings. She is slightly hyperglycemic. Plan will be to transfer the patient to [facility]. We do not have available beds for her to stay here at this time. I feel she will need a neurologic consult. I do not feel TPA or further work-up is necessary at this time. [DN]   Tue Feb 04, 2020   0036 The patient continues to do well on numerous re-evaluations. Awaiting transport to transport the patient to [facility]. No further neurologic events at this time. [DN]   0036 I did speak with the hospitalist who accepted transfer of the patient. [DN]      ED Course User Index  [DN] Salbador Wall DO         CONSULTS:    None    CRITICAL CARE:     None    PROCEDURES:    None    FINAL IMPRESSION      1. Confusion    2. Atypical chest pain          DISPOSITION/PLAN   DISPOSITION Decision To Transfer 02/03/2020 09:45:16 PM      Condition on Disposition    Improved    PATIENT REFERRED TO:  No follow-up provider specified.     DISCHARGE MEDICATIONS:  New Prescriptions    No medications on file       (Please note that portions of this note were completed with a voice recognition program.  Efforts were made to edit the dictations but occasionally words are mis-transcribed.)    Salbador Wall DO  Attending Emergency Physician       Salbador Wall DO  02/04/20 0037

## 2020-02-04 NOTE — ED TRIAGE NOTES
Patient presents to the ED with complaints of generalized shaking, forgetfulness and chest pain. She states that her chest pain was an \"8/10\" earlier today. She took a sublingual nitro and 324 mg of Aspirin and denies pain now. She also states she recently lost her brother and doesn't know if her symptoms are are relalted to stress. History of heart problems and past TIAs. No other questions or concerns at this time. Daughter at bedside. EKG completed. Patient placed on cardiac monitor. Call light within reach.

## 2020-02-04 NOTE — PROCEDURES
Pt has [employer] pacemaker, I called them and her model is not mri conditional. She cannot have a pacemaker. Doctor aware.

## 2020-02-04 NOTE — CONSULTS
Oral, Nightly  enoxaparin (LOVENOX) injection 40 mg, 40 mg, Subcutaneous, Daily  magnesium hydroxide (MILK OF MAGNESIA) 400 MG/5ML suspension 30 mL, 30 mL, Oral, Daily PRN  ondansetron (ZOFRAN) injection 4 mg, 4 mg, Intravenous, Q6H PRN  sodium chloride flush 0.9 % injection 10 mL, 10 mL, Intravenous, 2 times per day  sodium chloride flush 0.9 % injection 10 mL, 10 mL, Intravenous, PRN  clopidogrel (PLAVIX) tablet 75 mg, 75 mg, Oral, Daily  Allergies:  Aggrenox [aspirin-dipyridamole er] and Keflex [cephalexin]    Social History:  TOBACCO:   reports that she has never smoked. She has never used smokeless tobacco.  ETOH:   reports no history of alcohol use. DRUGS:   reports no history of drug use. ACTIVITIES OF DAILY LIVING:  Patient is able to perform all activities of daily living. INSTRUMENTAL ACTIVITIES OF DAILY LIVING:  Patient is able to perform all instrumental activities of daily living. Patient currently lives with daughter. Family History:   Older sister with probable dementia     REVIEW OF SYSTEMS:  CONSTITUTIONAL:  positive for  chills  EYES:  negative for  double vision, blurred vision, visual disturbance. HEENT:  negative for  hearing loss and tinnitus  RESPIRATORY:  negative  CARDIOVASCULAR:  negative for  chest pain, dyspnea, palpitations. GASTROINTESTINAL:  negative  GENITOURINARY:  negative  INTEGUMENT/BREAST:  negative  HEMATOLOGIC/LYMPHATIC:  negative  ALLERGIC/IMMUNOLOGIC:  negative  MUSCULOSKELETAL:  negative for  myalgias, arthralgias, pain, joint swelling, stiff joints, decreased range of motion and muscle weakness  NEUROLOGICAL:  negative for headaches, dizziness, seizures, speech problems, visual disturbance, coordination problems, gait problems, tremor, weakness. Positive for memory loss and right sided facial numbness.    BEHAVIOR/PSYCH:  negative    PHYSICAL EXAM:    Vitals:  /79   Pulse 74   Temp 97.8 °F (36.6 °C) (Oral)   Resp 15   Ht 5' (1.524 m)   Wt 170 lb 13.7 oz (77.5 kg)   SpO2 95%   BMI 33.37 kg/m²      General Appearance:  Well appearing, in no acute distress. Mental Status Exam:             Level of Alertness:   awake            Orientation:   person, place, time            Memory:   normal            Fund of Knowledge:  normal            Attention/Concentration:  normal            Language:  normal    Cranial Nerves        Cranial nerve II           Visual acuity:  normal           Visual fields:  normal      Cranial nerve III           Pupils:  equal, round, reactive to light      Cranial nerves III, IV, VI           Extraocular Movements: intact      Cranial nerve V           Facial sensation:  Abnormal, slight loss of sensation to light touch along V2 on the right side.        Cranial nerve VII           Facial strength: intact      Cranial nerve VIII           Hearing:  intact      Cranial nerve IX           Palate:  intact      Cranial nerve XI         Shoulder shrug:  intact      Cranial nerve XII          Tongue movement:  normal    Motor:    Drift:  absent  Motor exam is symmetrical 5 out of 5 all extremities bilaterally  Tone:  normal  Abnormal Movements:  absent            Sensory:        Pinprick             Right Upper Extremity:  normal             Left Upper Extremity:  normal             Right Lower Extremity:  normal             Left Lower Extremity:  normal            Touch              Right Upper Extremity:  normal              Left Upper Extremity:  normal              Right Lower Extremity:  normal              Left Lower Extremity:  normal         Coordination:           Finger/Nose   Right:  normal              Left:  normal              Rapid Alternating Movements              Right:  normal              Left:  normal          Gait:                       Casual:  normal                Reflexes:             Deep Tendon Reflexes:    Right Bicep:  2+  Left Bicep:  2+  Right Tricep:  2+  Left Tricep:  2+  Right Brachioradialis:  2+  Left

## 2020-02-05 ENCOUNTER — TELEPHONE (OUTPATIENT)
Dept: FAMILY MEDICINE CLINIC | Age: 55
End: 2020-02-05

## 2020-02-05 VITALS
OXYGEN SATURATION: 99 % | BODY MASS INDEX: 33.54 KG/M2 | HEIGHT: 60 IN | SYSTOLIC BLOOD PRESSURE: 131 MMHG | WEIGHT: 170.86 LBS | DIASTOLIC BLOOD PRESSURE: 84 MMHG | RESPIRATION RATE: 19 BRPM | TEMPERATURE: 98.2 F | HEART RATE: 73 BPM

## 2020-02-05 LAB
ALBUMIN SERPL-MCNC: 3.7 G/DL (ref 3.5–5.2)
ALBUMIN/GLOBULIN RATIO: 1.1 (ref 1–2.5)
ALP BLD-CCNC: 71 U/L (ref 35–104)
ALT SERPL-CCNC: 17 U/L (ref 5–33)
ANION GAP SERPL CALCULATED.3IONS-SCNC: 11 MMOL/L (ref 9–17)
AST SERPL-CCNC: 13 U/L
BILIRUB SERPL-MCNC: 0.39 MG/DL (ref 0.3–1.2)
BUN BLDV-MCNC: 12 MG/DL (ref 6–20)
BUN/CREAT BLD: ABNORMAL (ref 9–20)
CALCIUM SERPL-MCNC: 8.9 MG/DL (ref 8.6–10.4)
CHLORIDE BLD-SCNC: 102 MMOL/L (ref 98–107)
CHOLESTEROL/HDL RATIO: 4.6
CHOLESTEROL: 156 MG/DL
CO2: 24 MMOL/L (ref 20–31)
CREAT SERPL-MCNC: 0.45 MG/DL (ref 0.5–0.9)
CULTURE: NORMAL
ESTIMATED AVERAGE GLUCOSE: 278 MG/DL
GFR AFRICAN AMERICAN: >60 ML/MIN
GFR NON-AFRICAN AMERICAN: >60 ML/MIN
GFR SERPL CREATININE-BSD FRML MDRD: ABNORMAL ML/MIN/{1.73_M2}
GFR SERPL CREATININE-BSD FRML MDRD: ABNORMAL ML/MIN/{1.73_M2}
GLUCOSE BLD-MCNC: 228 MG/DL (ref 65–105)
GLUCOSE BLD-MCNC: 243 MG/DL (ref 70–99)
GLUCOSE BLD-MCNC: 264 MG/DL (ref 65–105)
HBA1C MFR BLD: 11.3 % (ref 4–6)
HCT VFR BLD CALC: 42.4 % (ref 36.3–47.1)
HDLC SERPL-MCNC: 34 MG/DL
HEMOGLOBIN: 13.5 G/DL (ref 11.9–15.1)
LDL CHOLESTEROL: 75 MG/DL (ref 0–130)
Lab: NORMAL
MCH RBC QN AUTO: 28.2 PG (ref 25.2–33.5)
MCHC RBC AUTO-ENTMCNC: 31.8 G/DL (ref 28.4–34.8)
MCV RBC AUTO: 88.7 FL (ref 82.6–102.9)
NRBC AUTOMATED: 0 PER 100 WBC
PDW BLD-RTO: 12.8 % (ref 11.8–14.4)
PLATELET # BLD: 172 K/UL (ref 138–453)
PMV BLD AUTO: 11.4 FL (ref 8.1–13.5)
POTASSIUM SERPL-SCNC: 4.3 MMOL/L (ref 3.7–5.3)
RBC # BLD: 4.78 M/UL (ref 3.95–5.11)
SODIUM BLD-SCNC: 137 MMOL/L (ref 135–144)
SPECIMEN DESCRIPTION: NORMAL
TOTAL PROTEIN: 7 G/DL (ref 6.4–8.3)
TRIGL SERPL-MCNC: 234 MG/DL
VLDLC SERPL CALC-MCNC: ABNORMAL MG/DL (ref 1–30)
WBC # BLD: 6.1 K/UL (ref 3.5–11.3)

## 2020-02-05 PROCEDURE — 99235 HOSP IP/OBS SAME DATE MOD 70: CPT | Performed by: INTERNAL MEDICINE

## 2020-02-05 PROCEDURE — 97129 THER IVNTJ 1ST 15 MIN: CPT

## 2020-02-05 PROCEDURE — 85027 COMPLETE CBC AUTOMATED: CPT

## 2020-02-05 PROCEDURE — 6370000000 HC RX 637 (ALT 250 FOR IP): Performed by: INTERNAL MEDICINE

## 2020-02-05 PROCEDURE — 6370000000 HC RX 637 (ALT 250 FOR IP): Performed by: NURSE PRACTITIONER

## 2020-02-05 PROCEDURE — 80061 LIPID PANEL: CPT

## 2020-02-05 PROCEDURE — 36415 COLL VENOUS BLD VENIPUNCTURE: CPT

## 2020-02-05 PROCEDURE — 2580000003 HC RX 258: Performed by: NURSE PRACTITIONER

## 2020-02-05 PROCEDURE — 97130 THER IVNTJ EA ADDL 15 MIN: CPT

## 2020-02-05 PROCEDURE — 82947 ASSAY GLUCOSE BLOOD QUANT: CPT

## 2020-02-05 PROCEDURE — 80053 COMPREHEN METABOLIC PANEL: CPT

## 2020-02-05 PROCEDURE — 99232 SBSQ HOSP IP/OBS MODERATE 35: CPT

## 2020-02-05 PROCEDURE — 83036 HEMOGLOBIN GLYCOSYLATED A1C: CPT

## 2020-02-05 RX ADMIN — INSULIN LISPRO 4 UNITS: 100 INJECTION, SOLUTION INTRAVENOUS; SUBCUTANEOUS at 08:48

## 2020-02-05 RX ADMIN — Medication 10 ML: at 08:54

## 2020-02-05 RX ADMIN — CLOPIDOGREL 75 MG: 75 TABLET, FILM COATED ORAL at 08:45

## 2020-02-05 RX ADMIN — INSULIN LISPRO 6 UNITS: 100 INJECTION, SOLUTION INTRAVENOUS; SUBCUTANEOUS at 11:37

## 2020-02-05 RX ADMIN — RANOLAZINE 500 MG: 500 TABLET, FILM COATED, EXTENDED RELEASE ORAL at 08:45

## 2020-02-05 ASSESSMENT — PAIN SCALES - GENERAL: PAINLEVEL_OUTOF10: 0

## 2020-02-05 NOTE — DISCHARGE SUMMARY
02/05/2020    ANIONGAP 11 02/05/2020    BUN 12 02/05/2020    CREATININE 0.45 02/05/2020    BUNCRER NOT REPORTED 02/05/2020    CALCIUM 8.9 02/05/2020    LABGLOM >60 02/05/2020    GFRAA >60 02/05/2020    GFR      02/05/2020    GFR NOT REPORTED 02/05/2020     HFP:    Lab Results   Component Value Date    PROT 7.0 02/05/2020     CMP:    Lab Results   Component Value Date    GLUCOSE 243 02/05/2020     02/05/2020    K 4.3 02/05/2020     02/05/2020    CO2 24 02/05/2020    BUN 12 02/05/2020    CREATININE 0.45 02/05/2020    ANIONGAP 11 02/05/2020    ALKPHOS 71 02/05/2020    ALT 17 02/05/2020    AST 13 02/05/2020    BILITOT 0.39 02/05/2020    LABALBU 3.7 02/05/2020    ALBUMIN 1.1 02/05/2020    LABGLOM >60 02/05/2020    GFRAA >60 02/05/2020    GFR      02/05/2020    GFR NOT REPORTED 02/05/2020    PROT 7.0 02/05/2020    CALCIUM 8.9 02/05/2020     PT/INR:    Lab Results   Component Value Date    PROTIME 10.0 02/03/2020    INR 1.0 02/03/2020     PTT:   Lab Results   Component Value Date    APTT 24.5 02/03/2020     FLP:    Lab Results   Component Value Date    CHOL 156 02/05/2020    TRIG 234 02/05/2020    HDL 34 02/05/2020     U/A:    Lab Results   Component Value Date    COLORU NOT REPORTED 02/03/2020    TURBIDITY NOT REPORTED 02/03/2020    SPECGRAV 1.030 02/03/2020    HGBUR NEGATIVE 02/03/2020    PHUR 5.5 02/03/2020    PROTEINU NEGATIVE 02/03/2020    GLUCOSEU 3+ 02/03/2020    KETUA TRACE 02/03/2020    BILIRUBINUR NEGATIVE 02/03/2020    UROBILINOGEN Normal 02/03/2020    NITRU NEGATIVE 02/03/2020    LEUKOCYTESUR NEGATIVE 02/03/2020     TSH:  No results found for: TSH     Radiology:  Xr Chest Standard (2 Vw)    Result Date: 2/3/2020  Indwelling pacemaker. Otherwise unremarkable study. No evidence of acute cardiopulmonary process. Ct Head Wo Contrast    Result Date: 2/3/2020  No acute intracranial abnormality.      Cta Head Neck W Contrast    Result Date: 2/4/2020  No flow limiting stenosis or large vessel occlusion detected within the head or neck. Consultations:    Consults:     Final Specialist Recommendations/Findings:   IP CONSULT TO NEUROLOGY  IP CONSULT TO NEUROLOGY      The patient was seen and examined on day of discharge and this discharge summary is in conjunction with any daily progress note from day of discharge.     Discharge plan:     Disposition: Home    Physician Follow Up:         Requiring Further Evaluation/Follow Up POST HOSPITALIZATION/Incidental Findings:     Diet: diabetic diet    Activity: As tolerated    Instructions to Patient:     Discharge Medications:      Medication List      CONTINUE taking these medications    * albuterol (2.5 MG/3ML) 0.083% nebulizer solution  Commonly known as:  PROVENTIL     * Ventolin  (90 Base) MCG/ACT inhaler  Generic drug:  albuterol sulfate HFA     aspirin 325 MG tablet     [employer] VITAMIN B12 1000 MCG tablet  Generic drug:  cyanocobalamin     Digitek 250 MCG tablet  Generic drug:  digoxin     fluticasone-salmeterol 250-50 MCG/DOSE Aepb  Commonly known as:  ADVAIR     furosemide 80 MG tablet  Commonly known as:  LASIX     gabapentin 300 MG capsule  Commonly known as:  NEURONTIN     glipiZIDE 10 MG extended release tablet  Commonly known as:  GLUCOTROL XL     isosorbide mononitrate 120 MG extended release tablet  Commonly known as:  IMDUR     ketoconazole 2 % cream  Commonly known as:  NIZORAL     Magnesium Oxide 400 MG Caps     metFORMIN 1000 MG tablet  Commonly known as:  GLUCOPHAGE     montelukast 10 MG tablet  Commonly known as:  SINGULAIR     mupirocin 2 % ointment  Commonly known as:  BACTROBAN     nitroGLYCERIN 0.4 MG SL tablet  Commonly known as:  NITROSTAT     omeprazole 40 MG delayed release capsule  Commonly known as:  PRILOSEC     potassium chloride 20 MEQ extended release tablet  Commonly known as:  KLOR-CON M     ranolazine 500 MG extended release tablet  Commonly known as:  RANEXA     rivaroxaban 20 MG Tabs tablet  Commonly known as:  Rose Friendly

## 2020-02-05 NOTE — PROGRESS NOTES
NOT MRI CONDTIONAL MODEL#E053. PT CANNOT HAVE AN MRI. Medications:     atorvastatin  40 mg Oral Nightly    enoxaparin  40 mg Subcutaneous Daily    sodium chloride flush  10 mL Intravenous 2 times per day    clopidogrel  75 mg Oral Daily    insulin lispro  0-12 Units Subcutaneous TID WC    insulin lispro  0-6 Units Subcutaneous Nightly    ranolazine  500 mg Oral BID     PRN Meds include: magnesium hydroxide, ondansetron, sodium chloride flush, acetaminophen, glucose, dextrose, glucagon (rDNA), dextrose    Objective:   /84   Pulse 73   Temp 98.2 °F (36.8 °C) (Oral)   Resp 19   Ht 5' (1.524 m)   Wt 170 lb 13.7 oz (77.5 kg)   SpO2 99%   BMI 33.37 kg/m²     Blood pressure range: Systolic (77MBP), VYM:094 , Min:118 , MDX:955   ; Diastolic (77IXE), DFP:18, Min:67, Max:84      ROS:  CONSTITUTIONAL: negative for fatigue and malaise   EYES: negative for double vision and photophobia    HEENT: negative for tinnitus and sore throat   RESPIRATORY: negative for cough, shortness of breath   CARDIOVASCULAR: Positive for chest pressure. GASTROINTESTINAL: negative for abdominal pain, nausea, vomiting, diarrhea, or constipation    GENITOURINARY: negative for incontinence or retention    MUSCULOSKELETAL: negative for neck or back pain, negative for extremity pain   NEUROLOGICAL: Negative for seizures, headaches, weakness, numbness, confusion, aphasia, dysarthria.     PSYCHIATRIC: negative for agitation, hallucination, SI/HI   SKIN Negative for spontaneous contusions, rashes, or lesions        NEUROLOGIC EXAMINATION  GENERAL  Appears comfortable and in no distress   HEENT  NC/ AT   HEART  S1 and S2 heard; palpation of pulses: radial pulse    NECK  Supple and no bruits heard   MENTAL STATUS:  Alert, oriented, intact memory, no confusion, normal speech, normal language, no hallucination or delusion   CRANIAL NERVES: II     -      Visual fields intact to confrontation  III,IV,VI -  PERR, EOMs full, no ptosis  V

## 2020-02-05 NOTE — PROCEDURES
[facility], [address]      ELECTROENCEPHALOGRAM REPORT        REFERRING PHYSICIAN:  Ryan Torres MD  PATIENT NAME:Colette Mcclellan  PATIENT MRN: [id number]  DATE OF EE2020    BRIEF HISTORY: 48year old female with afib, CHF presented with speech changes. EEG to evaluate      CURRENT ANTI-EPILEPTIC MEDICATIONS: None    EEG DESCRIPTION:   During maximal wakefulness, the background was well organized and continuous consisting of an admixture of frequency of alpha, beta and theta ranging between 10-50uV. There was a posterior dominant alpha rhythm at 8-9 Hz, which was symmetric and reactive to eye opening/eye closure. Low amplitude 13-18 Hz beta rhythms were seen symmetrically over the frontal-central head regions. Spontaneous variability to stimulation were present. No persistent focal asymmetries or abnormalities were seen. No epileptiform discharges were recorded. No clinical or electrographic seizures were recorded. Stage I sleep were recorded with alpha dropout, slow rolling eye movements, and high voltage centrally predominant vertex waves. Stage II sleep was recorded with centrally predominant, bilateral and symmetric K complexes and sleep spindles at 14-16Hz. Synchronous and symmetric POSTs were seen. Hyperventilation was not performed, photic stimulation did not activate abnormal activity. Heart rate was regular at 60s beats per minute on a single lead EKG. CLASSIFICATION:  Normal (Awake, asleep)    IMPRESSION:  This was a normal routine awake and asleep EEG. There is no epileptiform discharges or EEG seizures on this recording.      Teri Rossi MD, [address], Neurology  Board Certified Epileptologist

## 2020-02-05 NOTE — PLAN OF CARE
Problem: Musculor/Skeletal Functional Status  Goal: Highest potential functional level  2/5/2020 1439 by Saurabh Espana RN  Outcome: Completed  2/5/2020 0542 by Rowena Person RN  Outcome: Met This Shift     Problem: Pain:  Goal: Pain level will decrease  Description  Pain level will decrease  2/5/2020 1439 by Saurabh Espana RN  Outcome: Completed  2/5/2020 0542 by Rowena Person RN  Outcome: Met This Shift  Goal: Control of acute pain  Description  Control of acute pain  2/5/2020 1439 by Saurabh Espana RN  Outcome: Completed  2/5/2020 0542 by Rowena Person RN  Outcome: Met This Shift  Goal: Control of chronic pain  Description  Control of chronic pain  2/5/2020 1439 by Saurabh Espana RN  Outcome: Completed  2/5/2020 0542 by Rowena Person RN  Outcome: Met This Shift     Problem: Falls - Risk of:  Goal: Will remain free from falls  Description  Will remain free from falls  2/5/2020 1439 by Saurabh Espana RN  Outcome: Completed  2/5/2020 0542 by Rowena Person RN  Outcome: Met This Shift  Goal: Absence of physical injury  Description  Absence of physical injury  2/5/2020 1439 by Saurabh Espana RN  Outcome: Completed  2/5/2020 0542 by Rowena Person RN  Outcome: Met This Shift

## 2020-02-05 NOTE — CARE COORDINATION
Discharge [address] Case Management Department  Written by: Mayela Lemons RN    Patient Name: Gelacio Morrow  Attending Provider: Soham Engle MD  Admit Date: 2020  4:10 AM  MRN: [id number]  Account: [de-identified]                     : 1965  Discharge Date: 2020      Disposition: home    Mayela Lemons RN

## 2020-02-11 ENCOUNTER — OFFICE VISIT (OUTPATIENT)
Dept: FAMILY MEDICINE CLINIC | Age: 55
End: 2020-02-11
Payer: MEDICARE

## 2020-02-11 VITALS
SYSTOLIC BLOOD PRESSURE: 114 MMHG | HEART RATE: 61 BPM | WEIGHT: 162 LBS | BODY MASS INDEX: 31.8 KG/M2 | HEIGHT: 60 IN | OXYGEN SATURATION: 98 % | RESPIRATION RATE: 14 BRPM | DIASTOLIC BLOOD PRESSURE: 62 MMHG

## 2020-02-11 PROCEDURE — 3046F HEMOGLOBIN A1C LEVEL >9.0%: CPT | Performed by: FAMILY MEDICINE

## 2020-02-11 PROCEDURE — G8427 DOCREV CUR MEDS BY ELIG CLIN: HCPCS | Performed by: FAMILY MEDICINE

## 2020-02-11 PROCEDURE — G8484 FLU IMMUNIZE NO ADMIN: HCPCS | Performed by: FAMILY MEDICINE

## 2020-02-11 PROCEDURE — 99204 OFFICE O/P NEW MOD 45 MIN: CPT | Performed by: FAMILY MEDICINE

## 2020-02-11 PROCEDURE — 1036F TOBACCO NON-USER: CPT | Performed by: FAMILY MEDICINE

## 2020-02-11 PROCEDURE — 1111F DSCHRG MED/CURRENT MED MERGE: CPT | Performed by: FAMILY MEDICINE

## 2020-02-11 PROCEDURE — 99202 OFFICE O/P NEW SF 15 MIN: CPT

## 2020-02-11 PROCEDURE — 3017F COLORECTAL CA SCREEN DOC REV: CPT | Performed by: FAMILY MEDICINE

## 2020-02-11 PROCEDURE — G8417 CALC BMI ABV UP PARAM F/U: HCPCS | Performed by: FAMILY MEDICINE

## 2020-02-11 PROCEDURE — 2022F DILAT RTA XM EVC RTNOPTHY: CPT | Performed by: FAMILY MEDICINE

## 2020-02-11 RX ORDER — ATORVASTATIN CALCIUM 40 MG/1
40 TABLET, FILM COATED ORAL DAILY
COMMUNITY

## 2020-02-11 RX ORDER — M-VIT,TX,IRON,MINS/CALC/FOLIC 27MG-0.4MG
1 TABLET ORAL DAILY
COMMUNITY

## 2020-02-11 RX ORDER — METOPROLOL SUCCINATE 100 MG/1
100 TABLET, EXTENDED RELEASE ORAL DAILY
COMMUNITY

## 2020-02-11 RX ORDER — SPIRONOLACTONE 25 MG/1
25 TABLET ORAL DAILY
COMMUNITY

## 2020-02-11 ASSESSMENT — PATIENT HEALTH QUESTIONNAIRE - PHQ9
SUM OF ALL RESPONSES TO PHQ QUESTIONS 1-9: 0
SUM OF ALL RESPONSES TO PHQ9 QUESTIONS 1 & 2: 0
1. LITTLE INTEREST OR PLEASURE IN DOING THINGS: 0
2. FEELING DOWN, DEPRESSED OR HOPELESS: 0
SUM OF ALL RESPONSES TO PHQ QUESTIONS 1-9: 0

## 2020-02-11 NOTE — PROGRESS NOTES
HPI:  Patient comes in today for   Chief Complaint   Patient presents with    Other     FOLLOW UP sT v'S   Patient here to f/u after she was hospitalized with stroke like symptoms with difficulty finding words,seen by neurology was to resume aspirin. Symtoms were improved. h/o multiple TIA in past ,CTA head and neck was ok . AlsoH/o DM type 2 ,Paroxysmal AFIB,CHF,s/p ICD,Asthma and HTN. Last ECHO in 10/2019 showed EF of 40%with global hypokinesia and grade 1 diastolic dysfunction. Recent hgba1c was 11.3,not complaint on medications since she moved here from [place],was out of insurnace. HISTORY:  Past Medical History:   Diagnosis Date    Asthma     Atrial fibrillation (HCC)     [facility] occlusion [facility] ([facility]     CHF (congestive heart failure) (HCC)     Diabetes mellitus ([facility]     Hyperlipidemia     Hypertension        Past Surgical History:   Procedure Laterality Date    CARDIAC SURGERY      ICD    PACEMAKER PLACEMENT  07/08/2014    [employer] PACEM[employer], NOT MRI CONDTIONAL MODEL#E053. PT CANNOT HAVE AN MRI. History reviewed. No pertinent family history.     Social History     Socioeconomic History    Marital status: Single     Spouse name: Not on file    Number of children: Not on file    Years of education: Not on file    Highest education level: Not on file   Occupational History    Not on file   Social Needs    Financial resource strain: Not on file    Food insecurity:     Worry: Not on file     Inability: Not on file    Transportation needs:     Medical: Not on file     Non-medical: Not on file   Tobacco Use    Smoking status: Never Smoker    Smokeless tobacco: Never Used   Substance and Sexual Activity    Alcohol use: Never     Frequency: Never    Drug use: Never    Sexual activity: Not on file   Lifestyle    Physical activity:     Days per week: Not on file     Minutes per session: Not on file    Stress: Not on file   Relationships    Social daily      metFORMIN (GLUCOPHAGE) 1000 MG tablet Take 1,000 mg by mouth 2 times daily      montelukast (SINGULAIR) 10 MG tablet Take 10 mg by mouth nightly      mupirocin (BACTROBAN) 2 % ointment Apply topically 2 times daily      nitroGLYCERIN (NITROSTAT) 0.4 MG SL tablet Place 0.4 mg under the tongue every 5 minutes as needed      omeprazole (PRILOSEC) 40 MG delayed release capsule Take 40 mg by mouth 2 times daily      potassium chloride (KLOR-CON M) 20 MEQ extended release tablet Take 20 mEq by mouth daily      rivaroxaban (XARELTO) 20 MG TABS tablet Take 20 mg by mouth Daily with supper      rOPINIRole (REQUIP) 0.25 MG tablet Take 0.25 mg by mouth nightly      sacubitril-valsartan (ENTRESTO)  MG per tablet Take 1 tablet by mouth daily      Magnesium Oxide 400 MG CAPS Take by mouth 2 times daily      albuterol (PROVENTIL) (2.5 MG/3ML) 0.083% nebulizer solution Inhale 2.5 mg into the lungs every 4 hours       No current facility-administered medications for this visit. Allergies   Allergen Reactions    Aggrenox [Aspirin-Dipyridamole Er]     Keflex [Cephalexin]        REVIEW OF SYSTEMS:  General: No fevers, chills, has lost some weight  HEENT: No double vision, blurry vision, runny nose, sore throat, tinnitus  Cardio: No chest pain, palpitations, PASTRANA, edema, PND  Pulmonary: No cough, hemoptysis, SOB  GI: No nausea, vomiting, dysphagia, odynophagia, diarrhea, constipation. : No dysuria, hematuria, urgency, incontinence  Musculoskeletal:Pain in knee mostly left, Noother muscle or joint aches, no joint swelling  Neuro: No dizziness/lightheadedness, no seizures. Has some headaches on and off.   Endocrine: No polyuria, polydipsia, polyphagia, no temperature intolerance  Skin: No lesions or itching  No problems with ADLs  Sleep: good  Psychiatric: No depression or anxiety    PHYSICAL EXAM:  VS:  /62   Pulse 61   Resp 14   Ht 5' (1.524 m)   Wt 162 lb (73.5 kg)   SpO2 98%   BMI

## 2022-10-29 ENCOUNTER — APPOINTMENT (OUTPATIENT)
Dept: GENERAL RADIOLOGY | Age: 57
End: 2022-10-29
Payer: MEDICARE

## 2022-10-29 ENCOUNTER — HOSPITAL ENCOUNTER (EMERGENCY)
Age: 57
Discharge: HOME | End: 2022-10-29
Attending: EMERGENCY MEDICINE
Payer: MEDICARE

## 2022-10-29 VITALS
HEIGHT: 57 IN | HEART RATE: 89 BPM | OXYGEN SATURATION: 95 % | DIASTOLIC BLOOD PRESSURE: 59 MMHG | BODY MASS INDEX: 31.93 KG/M2 | TEMPERATURE: 98.9 F | SYSTOLIC BLOOD PRESSURE: 112 MMHG | RESPIRATION RATE: 16 BRPM | WEIGHT: 148 LBS

## 2022-10-29 DIAGNOSIS — E83.42 HYPOMAGNESEMIA: ICD-10-CM

## 2022-10-29 DIAGNOSIS — R07.9 CHEST PAIN, UNSPECIFIED TYPE: Primary | ICD-10-CM

## 2022-10-29 LAB
ALBUMIN SERPL-MCNC: 4.6 G/DL (ref 3.5–5.2)
ALBUMIN/GLOBULIN RATIO: 1.4 (ref 1–2.5)
ALP BLD-CCNC: 67 U/L (ref 35–104)
ALT SERPL-CCNC: 12 U/L (ref 5–33)
ANION GAP SERPL CALCULATED.3IONS-SCNC: 11 MMOL/L (ref 9–17)
AST SERPL-CCNC: 14 U/L
BILIRUB SERPL-MCNC: 0.2 MG/DL (ref 0.3–1.2)
BUN BLDV-MCNC: 10 MG/DL (ref 6–20)
BUN/CREAT BLD: 16 (ref 9–20)
CALCIUM SERPL-MCNC: 10.3 MG/DL (ref 8.6–10.4)
CHLORIDE BLD-SCNC: 102 MMOL/L (ref 98–107)
CO2: 25 MMOL/L (ref 20–31)
CREAT SERPL-MCNC: 0.61 MG/DL (ref 0.5–0.9)
D-DIMER QUANTITATIVE: 0.42 MG/L FEU (ref 0–0.59)
EKG ATRIAL RATE: 104 BPM
EKG P AXIS: 70 DEGREES
EKG P-R INTERVAL: 144 MS
EKG Q-T INTERVAL: 350 MS
EKG QRS DURATION: 142 MS
EKG QTC CALCULATION (BAZETT): 460 MS
EKG R AXIS: -46 DEGREES
EKG T AXIS: 98 DEGREES
EKG VENTRICULAR RATE: 104 BPM
GFR SERPL CREATININE-BSD FRML MDRD: >60 ML/MIN/1.73M2
GLUCOSE BLD-MCNC: 131 MG/DL (ref 70–99)
HCT VFR BLD CALC: 40.1 % (ref 36.3–47.1)
HEMOGLOBIN: 13.4 G/DL (ref 11.9–15.1)
LIPASE: 46 U/L (ref 13–60)
MAGNESIUM: 1.5 MG/DL (ref 1.6–2.6)
MCH RBC QN AUTO: 28.6 PG (ref 25.2–33.5)
MCHC RBC AUTO-ENTMCNC: 33.4 G/DL (ref 25.2–33.5)
MCV RBC AUTO: 85.7 FL (ref 82.6–102.9)
NRBC AUTOMATED: 0 PER 100 WBC
PDW BLD-RTO: 13.9 % (ref 11.8–14.4)
PLATELET # BLD: 262 K/UL (ref 138–453)
PMV BLD AUTO: 11.2 FL (ref 8.1–13.5)
POTASSIUM SERPL-SCNC: 4.2 MMOL/L (ref 3.7–5.3)
PRO-BNP: 79 PG/ML
RBC # BLD: 4.68 M/UL (ref 3.95–5.11)
SODIUM BLD-SCNC: 138 MMOL/L (ref 135–144)
TOTAL PROTEIN: 7.9 G/DL (ref 6.4–8.3)
TROPONIN, HIGH SENSITIVITY: 6 NG/L (ref 0–14)
TROPONIN, HIGH SENSITIVITY: 7 NG/L (ref 0–14)
WBC # BLD: 9.6 K/UL (ref 3.5–11.3)

## 2022-10-29 PROCEDURE — 80053 COMPREHEN METABOLIC PANEL: CPT

## 2022-10-29 PROCEDURE — 6370000000 HC RX 637 (ALT 250 FOR IP): Performed by: EMERGENCY MEDICINE

## 2022-10-29 PROCEDURE — 96376 TX/PRO/DX INJ SAME DRUG ADON: CPT

## 2022-10-29 PROCEDURE — 96375 TX/PRO/DX INJ NEW DRUG ADDON: CPT

## 2022-10-29 PROCEDURE — 93005 ELECTROCARDIOGRAM TRACING: CPT | Performed by: EMERGENCY MEDICINE

## 2022-10-29 PROCEDURE — 83690 ASSAY OF LIPASE: CPT

## 2022-10-29 PROCEDURE — 71045 X-RAY EXAM CHEST 1 VIEW: CPT

## 2022-10-29 PROCEDURE — 6360000002 HC RX W HCPCS: Performed by: EMERGENCY MEDICINE

## 2022-10-29 PROCEDURE — 83880 ASSAY OF NATRIURETIC PEPTIDE: CPT

## 2022-10-29 PROCEDURE — 83735 ASSAY OF MAGNESIUM: CPT

## 2022-10-29 PROCEDURE — 85379 FIBRIN DEGRADATION QUANT: CPT

## 2022-10-29 PROCEDURE — 96374 THER/PROPH/DIAG INJ IV PUSH: CPT

## 2022-10-29 PROCEDURE — 99285 EMERGENCY DEPT VISIT HI MDM: CPT

## 2022-10-29 PROCEDURE — 36415 COLL VENOUS BLD VENIPUNCTURE: CPT

## 2022-10-29 PROCEDURE — 85027 COMPLETE CBC AUTOMATED: CPT

## 2022-10-29 PROCEDURE — 84484 ASSAY OF TROPONIN QUANT: CPT

## 2022-10-29 RX ORDER — MORPHINE SULFATE 2 MG/ML
2 INJECTION, SOLUTION INTRAMUSCULAR; INTRAVENOUS ONCE
Status: COMPLETED | OUTPATIENT
Start: 2022-10-29 | End: 2022-10-29

## 2022-10-29 RX ORDER — LANOLIN ALCOHOL/MO/W.PET/CERES
400 CREAM (GRAM) TOPICAL ONCE
Status: COMPLETED | OUTPATIENT
Start: 2022-10-29 | End: 2022-10-29

## 2022-10-29 RX ORDER — ONDANSETRON 2 MG/ML
4 INJECTION INTRAMUSCULAR; INTRAVENOUS ONCE
Status: COMPLETED | OUTPATIENT
Start: 2022-10-29 | End: 2022-10-29

## 2022-10-29 RX ORDER — NITROGLYCERIN 0.4 MG/1
0.4 TABLET SUBLINGUAL ONCE
Status: COMPLETED | OUTPATIENT
Start: 2022-10-29 | End: 2022-10-29

## 2022-10-29 RX ORDER — RANOLAZINE 500 MG/1
500 TABLET, EXTENDED RELEASE ORAL ONCE
Status: COMPLETED | OUTPATIENT
Start: 2022-10-29 | End: 2022-10-29

## 2022-10-29 RX ADMIN — MORPHINE SULFATE 2 MG: 2 INJECTION, SOLUTION INTRAMUSCULAR; INTRAVENOUS at 22:23

## 2022-10-29 RX ADMIN — MORPHINE SULFATE 2 MG: 2 INJECTION, SOLUTION INTRAMUSCULAR; INTRAVENOUS at 21:39

## 2022-10-29 RX ADMIN — RANOLAZINE 500 MG: 500 TABLET, FILM COATED, EXTENDED RELEASE ORAL at 22:56

## 2022-10-29 RX ADMIN — NITROGLYCERIN 0.4 MG: 0.4 TABLET SUBLINGUAL at 21:14

## 2022-10-29 RX ADMIN — ONDANSETRON 4 MG: 2 INJECTION INTRAMUSCULAR; INTRAVENOUS at 21:39

## 2022-10-29 RX ADMIN — Medication 400 MG: at 22:05

## 2022-10-29 ASSESSMENT — PAIN SCALES - GENERAL
PAINLEVEL_OUTOF10: 10
PAINLEVEL_OUTOF10: 10
PAINLEVEL_OUTOF10: 6
PAINLEVEL_OUTOF10: 6
PAINLEVEL_OUTOF10: 10
PAINLEVEL_OUTOF10: 2
PAINLEVEL_OUTOF10: 4

## 2022-10-29 ASSESSMENT — ENCOUNTER SYMPTOMS
VOMITING: 0
NAUSEA: 0
CHEST TIGHTNESS: 1
BACK PAIN: 0
SHORTNESS OF BREATH: 0
WHEEZING: 0
SORE THROAT: 0
DIARRHEA: 0
STRIDOR: 0
CONSTIPATION: 0
ABDOMINAL PAIN: 0
COLOR CHANGE: 0

## 2022-10-29 ASSESSMENT — PAIN DESCRIPTION - PAIN TYPE: TYPE: ACUTE PAIN

## 2022-10-29 ASSESSMENT — PAIN DESCRIPTION - ORIENTATION: ORIENTATION: LEFT

## 2022-10-29 ASSESSMENT — PAIN DESCRIPTION - LOCATION: LOCATION: CHEST

## 2022-10-29 ASSESSMENT — PAIN - FUNCTIONAL ASSESSMENT: PAIN_FUNCTIONAL_ASSESSMENT: 0-10

## 2022-10-29 ASSESSMENT — PAIN DESCRIPTION - FREQUENCY: FREQUENCY: CONTINUOUS

## 2022-10-29 ASSESSMENT — PAIN DESCRIPTION - ONSET: ONSET: ON-GOING

## 2022-10-29 ASSESSMENT — PAIN DESCRIPTION - DESCRIPTORS: DESCRIPTORS: PRESSURE;ACHING

## 2022-10-30 NOTE — ED PROVIDER NOTES
[facility] ED  [address]  [place] [address]  Phone: [phone]        Pt Name: Sonja Block  MRN: [id number]  [place] 1965  Date of evaluation: 10/29/22      CHIEF COMPLAINT     Chief Complaint   Patient presents with    Chest Pain         HISTORY OF PRESENT ILLNESS  (Location/Symptom, Timing/Onset, Context/Setting, Quality, Duration, Modifying Factors, Severity.)    Sonja Block is a 49 y.o. female who presents with chest pain. She describes the pain as being a pressure kind of pain under her left breast.  Does not radiate to her shoulder neck or jaw. She denies any shortness of breath nausea or diaphoresis. He has had this pain intermittently over the last 2 to 3 days. Pain got worse this evening. She currently rates her pain as a 10 on a scale of 1-10. Patient has a history of nonischemic cardiomyopathy, ICD, hypertension, high cholesterol, paroxysmal atrial fibrillation, and TIAs. Patient is on Xarelto and states she is not to take aspirin because of the Xarelto she is on. She had a heart cath January 2018. I do not have the full report but from the cardiology note it showed nonsignificant coronary artery disease. An echocardiogram in 2019 shows an EF of 40% with moderate global hypokinesis. Cardiologist made a note that patient is on Ranexa and she was on 500 mg twice daily. If she continued to have chest pain they were going to increase to 1000 mg twice daily. Patient states her Ranexa has not been increased she still taking the 500 mg twice daily. She is also on Imdur 120 mg daily and Toprol- mg daily. Patient has taken a total of 6 nitroglycerin sublingual over the last 2 to 3 days. She states initially when she would take a nitroglycerin it would relieve her pain but then the pain would come back. Prior to coming into the emergency department she took a total of 3 nitroglycerin with no relief of her pain.   She denies any fever chills cough or congestion. She denies any abdominal pain. She denies having any swelling to her lower extremities or calf pain. She has a significant family history of coronary artery disease. This pain is nonreproducible to palpation of the chest wall      REVIEW OF SYSTEMS    (2-9 systems for level 4, 10 or more for level 5)     Review of Systems   Constitutional:  Negative for chills and fever. HENT:  Negative for congestion and sore throat. Respiratory:  Positive for chest tightness. Negative for shortness of breath, wheezing and stridor. Cardiovascular:  Positive for chest pain. Negative for palpitations and leg swelling. Gastrointestinal:  Negative for abdominal pain, constipation, diarrhea, nausea and vomiting. Genitourinary:  Negative for difficulty urinating and dysuria. Musculoskeletal:  Negative for back pain and neck pain. Skin:  Negative for color change and rash. Neurological:  Negative for dizziness, syncope, facial asymmetry, speech difficulty, weakness, light-headedness, numbness and headaches. PAST MEDICAL HISTORY    has a past medical history of Asthma, Atrial fibrillation ([facility] Cerebral artery occlusion with cerebral infarction [facility]), CHF (congestive heart failure) ([facility] Diabetes [facility] Hyperlipidemia, and Hypertension. SURGICAL HISTORY      has a past surgical history that includes Cardiac surgery and pacemaker placement (07/08/2014).     [address]       Current Discharge Medication List        CONTINUE these medications which have NOT CHANGED    Details   spironolactone (ALDACTONE) 25 MG tablet Take 25 mg by mouth daily      atorvastatin (LIPITOR) 40 MG tablet Take 40 mg by mouth daily      metoprolol succinate (TOPROL XL) 100 MG extended release tablet Take 100 mg by mouth daily      Multiple Vitamins-Minerals (THERAPEUTIC MULTIVITAMIN-MINERALS) tablet Take 1 tablet by mouth daily      aspirin 325 MG tablet Take by mouth      cyanocobalamin ([employer] VITAMIN B12) 1000 MCG tablet Take 1 tablet by mouth daily      ranolazine (RANEXA) 500 MG extended release tablet Take 500 mg by mouth 2 times daily      albuterol (PROVENTIL) (2.5 MG/3ML) 0.083% nebulizer solution Inhale 2.5 mg into the lungs every 4 hours      albuterol sulfate HFA (VENTOLIN HFA) 108 (90 Base) MCG/ACT inhaler Inhale 2 puffs into the lungs daily as needed      digoxin (DIGITEK) 250 MCG tablet Take 1 tablet by mouth daily      vitamin D (ERGOCALCIFEROL) 1.25 MG (35412 UT) CAPS capsule Take 1 capsule by mouth every 7 days      fluticasone-salmeterol (ADVAIR) 250-50 MCG/DOSE AEPB Inhale 2 puffs into the lungs 2 times daily      furosemide (LASIX) 80 MG tablet Take 1 tablet by mouth daily      gabapentin (NEURONTIN) 300 MG capsule Take 300 mg by mouth daily.       glipiZIDE (GLUCOTROL XL) 10 MG extended release tablet Take 1 tablet by mouth daily      isosorbide mononitrate (IMDUR) 120 MG extended release tablet Take 120 mg by mouth daily      ketoconazole (NIZORAL) 2 % cream Apply topically 2 times daily      metFORMIN (GLUCOPHAGE) 1000 MG tablet Take 1,000 mg by mouth 2 times daily      montelukast (SINGULAIR) 10 MG tablet Take 10 mg by mouth nightly      mupirocin (BACTROBAN) 2 % ointment Apply topically 2 times daily      nitroGLYCERIN (NITROSTAT) 0.4 MG SL tablet Place 0.4 mg under the tongue every 5 minutes as needed      omeprazole (PRILOSEC) 40 MG delayed release capsule Take 40 mg by mouth 2 times daily      potassium chloride (KLOR-CON M) 20 MEQ extended release tablet Take 20 mEq by mouth daily      rivaroxaban (XARELTO) 20 MG TABS tablet Take 20 mg by mouth Daily with supper      rOPINIRole (REQUIP) 0.25 MG tablet Take 0.25 mg by mouth nightly      sacubitril-valsartan (ENTRESTO)  MG per tablet Take 1 tablet by mouth daily      Magnesium Oxide 400 MG CAPS Take by mouth 2 times daily             ALLERGIES     is allergic to aggrenox [aspirin-dipyridamole er] and keflex [cephalexin]. FAMILY HISTORY     has no family status information on file. family history is not on file. SOCIAL HISTORY      reports that she has never smoked. She has never used smokeless tobacco. She reports that she does not drink alcohol and does not use drugs. PHYSICAL EXAM    (up to 7 for level 4, 8 or more for level 5)   INITIAL VITALS:  height is 4' 9\" (1.448 m) and weight is 148 lb (67.1 kg). Her tympanic temperature is 98.9 °F (37.2 °C). Her blood pressure is 114/65 and her pulse is 97. Her respiration is 18 and oxygen saturation is 96%. Physical Exam  Vitals reviewed. Constitutional:       General: She is in acute distress. Comments: Is in moderate distress secondary to chest pain. She rates it as a 10 on a scale of 1-10. She is hypertensive with a blood pressure of 159/91 and tachycardic with a pulse rate of 107. O2 sat is 99% on room air she is not tachypneic. HENT:      Head: Normocephalic and atraumatic. Eyes:      Extraocular Movements: Extraocular movements intact. Pupils: Pupils are equal, round, and reactive to light. Cardiovascular:      Rate and Rhythm: Tachycardia present. Pulses: Normal pulses. Heart sounds: Normal heart sounds. Pulmonary:      Effort: Pulmonary effort is normal. No tachypnea, bradypnea, accessory muscle usage, prolonged expiration, respiratory distress or retractions. Breath sounds: Normal breath sounds and air entry. Chest:      Chest wall: No tenderness or crepitus. Abdominal:      General: Bowel sounds are normal.      Palpations: Abdomen is soft. Tenderness: There is no abdominal tenderness. There is no right CVA tenderness, left CVA tenderness, guarding or rebound. Negative signs include Hanna's sign, Rovsing's sign and McBurney's sign. Musculoskeletal:      Cervical back: Normal range of motion. No signs of trauma or rigidity. No pain with movement, spinous process tenderness or muscular tenderness. Normal range of motion. Right lower leg: No edema. Left lower leg: No edema. Lymphadenopathy:      Cervical: No cervical adenopathy. Right cervical: No superficial, deep or posterior cervical adenopathy. Left cervical: No superficial, deep or posterior cervical adenopathy. Skin:     General: Skin is warm. Capillary Refill: Capillary refill takes less than 2 seconds. Findings: No rash. Neurological:      General: No focal deficit present. GCS: GCS eye subscore is 4. GCS verbal subscore is 5. GCS motor subscore is 6. Sensory: Sensation is intact. Motor: Motor function is intact. Gait: Gait is intact. Psychiatric:         Attention and Perception: Attention normal.         Mood and Affect: Mood is anxious. Speech: Speech normal.         Behavior: Behavior normal.         Cognition and Memory: Cognition normal.         Judgment: Judgment normal.       DIFFERENTIAL DIAGNOSIS/ MDM:     Patient has a history of nonischemic cardiomyopathy but she also has a strong family history of coronary artery disease, hypertension, high cholesterol. We will do a cardiac work-up with this chest pain. Also obtain a D-dimer for possible PE. Patient does have a history of paroxysmal atrial fibrillation but she is on Xarelto. She has been told not to take aspirin because she is on Xarelto. With her history of nonischemic cardiomyopathy I will hold off on giving aspirin at this time. She denies any cough or congestion so has a low probability of pneumonia. We will check LFTs and a lipase for possible GI cause for her chest pain.       DIAGNOSTIC RESULTS     EKG: All EKG's are interpreted by the Emergency Department Physician who either signs or Co-signs this chart in the absence of a cardiologist.      Interpreted by Abena Ward DO     Rhythm: Sinus tachycardia  Rate: 104  Axis: left  Ectopy: none  Conduction: Right bundle branch block and left anterior fascicular block   ST Segments: ST depression in leads I and aVL  T Waves: Inverted T waves V1 V2 V3  Q Waves: none  Previous EKG 2/3/2020 shows minimal ST depression in leads I and aVL. Patient has increased ST depression on today's EKG. Clinical Impression: normal sinus rhythm with no acute changes/normal EKG. No acute infarction/ischemia noted. RADIOLOGY:        Interpretation per the Radiologist below, if available at the time of this note:      XR CHEST PORTABLE    Result Date: 10/29/2022  EXAMINATION: ONE XRAY VIEW OF THE CHEST 10/29/2022 9:51 pm COMPARISON: February 3, 2020 HISTORY: ORDERING SYSTEM PROVIDED HISTORY: Chest Pain TECHNOLOGIST PROVIDED HISTORY: Chest Pain Reason for Exam: Chest pain, h/o pacemaker FINDINGS: No infiltrate or consolidation or effusion is identified. The left lung base is partially obscured by the patient's pulse generator. 2 leads are seen projecting over the heart. The heart size is magnified by portable technique. No acute abnormality visualized.         LABS:  Results for orders placed or performed during the hospital encounter of 10/29/22   CBC   Result Value Ref Range    WBC 9.6 3.5 - 11.3 k/uL    RBC 4.68 3.95 - 5.11 m/uL    Hemoglobin 13.4 11.9 - 15.1 g/dL    Hematocrit 40.1 36.3 - 47.1 %    MCV 85.7 82.6 - 102.9 fL    MCH 28.6 25.2 - 33.5 pg    MCHC 33.4 25.2 - 33.5 g/dL    RDW 13.9 11.8 - 14.4 %    Platelets 996 893 - 306 k/uL    MPV 11.2 8.1 - 13.5 fL    NRBC Automated 0.0 0.0 per 100 WBC   Comprehensive Metabolic Panel   Result Value Ref Range    Glucose 131 (H) 70 - 99 mg/dL    BUN 10 6 - 20 mg/dL    Creatinine 0.61 0.50 - 0.90 mg/dL    Est, Glom Filt Rate >60 >60 mL/min/1.73m2    Bun/Cre Ratio 16 9 - 20    Calcium 10.3 8.6 - 10.4 mg/dL    Sodium 138 135 - 144 mmol/L    Potassium 4.2 3.7 - 5.3 mmol/L    Chloride 102 98 - 107 mmol/L    CO2 25 20 - 31 mmol/L    Anion Gap 11 9 - 17 mmol/L    Alkaline Phosphatase 67 35 - 104 U/L    ALT 12 5 - 33 U/L    AST 14 <32 U/L Total Bilirubin 0.2 (L) 0.3 - 1.2 mg/dL    Total Protein 7.9 6.4 - 8.3 g/dL    Albumin 4.6 3.5 - 5.2 g/dL    Albumin/Globulin Ratio 1.4 1.0 - 2.5   Troponin   Result Value Ref Range    Troponin, High Sensitivity 7 0 - 14 ng/L   Troponin   Result Value Ref Range    Troponin, High Sensitivity 6 0 - 14 ng/L   Lipase   Result Value Ref Range    Lipase 46 13 - 60 U/L   Magnesium   Result Value Ref Range    Magnesium 1.5 (L) 1.6 - 2.6 mg/dL   D-Dimer, Quantitative   Result Value Ref Range    D-Dimer, Quant 0.42 0.00 - 0.59 mg/L FEU   Brain Natriuretic Peptide   Result Value Ref Range    Pro-BNP 79 <300 pg/mL   EKG 12 Lead   Result Value Ref Range    Ventricular Rate 104 BPM    Atrial Rate 104 BPM    P-R Interval 144 ms    QRS Duration 142 ms    Q-T Interval 350 ms    QTc Calculation (Bazett) 460 ms    P Axis 70 degrees    R Axis -46 degrees    T Axis 98 degrees           EMERGENCY DEPARTMENT COURSE:   Vitals:    Vitals:    10/29/22 2132 10/29/22 2200 10/29/22 2230 10/29/22 2300   BP: 135/79 (!) 142/84 (!) 121/95 114/65   Pulse: 79 87 95 97   Resp: 16 16 16 18   Temp:       TempSrc:       SpO2: 96% 97% 98% 96%   Weight:       Height:         -------------------------  BP: 114/65, Temp: 98.9 °F (37.2 °C), Heart Rate: 97, Resp: 18      RE-EVALUATION:  9:35 PM EDT patient got no relief with the nitroglycerin sublingual.  She is already taken 3 at home. We will go ahead and give her a dose of morphine with some Zofran. 10:17 PM EDT patient rates her pain about a 5 or 6 now after the 2 mg of morphine we will give her another dose of the morphine. She has a normal troponin and a normal D-dimer. I do not feel like she is having ischemic heart disease. If her chest pain is relieved with the morphine we will discharge her home with follow-up with her cardiologist.  She may need to have her Ranexa increased from the 500 mg twice daily to 1000 mg twice daily  11:29 PM EDT patient got improvement with the Ranexa.   Was instructed to go ahead and take her evening dose of Ranexa she actually be getting an extra dose tonight. She will be discharged home with outpatient follow-up with her cardiologist.  Repeat troponin is normal.  CONSULTS:      PROCEDURES:  None    FINAL IMPRESSION      1. Chest pain, unspecified type    2. Hypomagnesemia          DISPOSITION/PLAN   DISPOSITION Decision To Discharge 10/29/2022 11:29:17 PM      CONDITION ON DISPOSITION:   Improved    PATIENT REFERRED TO:  Vivian Carcamo MD  [address] [place] [address] [facility]  [phone]    Call in 2 days      DISCHARGE MEDICATIONS:  Current Discharge Medication List          (Please note that portions of this note were completed with a voicerecognition program.  Efforts were made to edit the dictations but occasionally words are mis-transcribed. )    Rachel Good DO, , MD, F.A.C.E.P.   Attending Emergency Medicine Physician        Willim Fothergill,   10/29/22 [address], DO  10/29/22 [id number]

## 2022-10-30 NOTE — DISCHARGE INSTRUCTIONS
Gave you a dose of Ranexa here in the emergency department. Go ahead and take your evening dose as well so that you will be getting an extra dose of the Ranexa today. Return if you are having increased chest pain.   Contact Dr. Phuc Schuster office so you can get in possibly this next week

## 2022-11-20 ENCOUNTER — HOSPITAL ENCOUNTER (EMERGENCY)
Age: 57
Discharge: HOME | End: 2022-11-20
Attending: EMERGENCY MEDICINE
Payer: MEDICARE

## 2022-11-20 ENCOUNTER — APPOINTMENT (OUTPATIENT)
Dept: CT IMAGING | Age: 57
End: 2022-11-20
Payer: MEDICARE

## 2022-11-20 VITALS
HEIGHT: 57 IN | SYSTOLIC BLOOD PRESSURE: 172 MMHG | RESPIRATION RATE: 16 BRPM | HEART RATE: 98 BPM | OXYGEN SATURATION: 100 % | BODY MASS INDEX: 32.36 KG/M2 | DIASTOLIC BLOOD PRESSURE: 96 MMHG | TEMPERATURE: 97.9 F | WEIGHT: 150 LBS

## 2022-11-20 DIAGNOSIS — R42 DIZZINESS: Primary | ICD-10-CM

## 2022-11-20 LAB
ABSOLUTE EOS #: 0.33 K/UL (ref 0–0.44)
ABSOLUTE IMMATURE GRANULOCYTE: 0.04 K/UL (ref 0–0.3)
ABSOLUTE LYMPH #: 3.43 K/UL (ref 1.1–3.7)
ABSOLUTE MONO #: 0.48 K/UL (ref 0.1–1.2)
ANION GAP SERPL CALCULATED.3IONS-SCNC: 15 MMOL/L (ref 9–17)
BASOPHILS # BLD: 1 % (ref 0–2)
BASOPHILS ABSOLUTE: 0.05 K/UL (ref 0–0.2)
BUN BLDV-MCNC: 19 MG/DL (ref 6–20)
BUN/CREAT BLD: 29 (ref 9–20)
CALCIUM SERPL-MCNC: 10.6 MG/DL (ref 8.6–10.4)
CHLORIDE BLD-SCNC: 100 MMOL/L (ref 98–107)
CO2: 25 MMOL/L (ref 20–31)
CREAT SERPL-MCNC: 0.66 MG/DL (ref 0.5–0.9)
EOSINOPHILS RELATIVE PERCENT: 4 % (ref 1–4)
GFR SERPL CREATININE-BSD FRML MDRD: >60 ML/MIN/1.73M2
GLUCOSE BLD-MCNC: 107 MG/DL (ref 70–99)
HCT VFR BLD CALC: 39.4 % (ref 36.3–47.1)
HEMOGLOBIN: 12.9 G/DL (ref 11.9–15.1)
IMMATURE GRANULOCYTES: 0 %
LYMPHOCYTES # BLD: 38 % (ref 24–43)
MCH RBC QN AUTO: 27.6 PG (ref 25.2–33.5)
MCHC RBC AUTO-ENTMCNC: 32.7 G/DL (ref 25.2–33.5)
MCV RBC AUTO: 84.4 FL (ref 82.6–102.9)
MONOCYTES # BLD: 5 % (ref 3–12)
PDW BLD-RTO: 13.7 % (ref 11.8–14.4)
PLATELET # BLD: 269 K/UL (ref 138–453)
PMV BLD AUTO: 11.7 FL (ref 8.1–13.5)
POTASSIUM SERPL-SCNC: 4.8 MMOL/L (ref 3.7–5.3)
RBC # BLD: 4.67 M/UL (ref 3.95–5.11)
SEG NEUTROPHILS: 52 % (ref 36–65)
SEGMENTED NEUTROPHILS ABSOLUTE COUNT: 4.67 K/UL (ref 1.5–8.1)
SODIUM BLD-SCNC: 140 MMOL/L (ref 135–144)
WBC # BLD: 9 K/UL (ref 3.5–11.3)

## 2022-11-20 PROCEDURE — 85025 COMPLETE CBC W/AUTO DIFF WBC: CPT

## 2022-11-20 PROCEDURE — 80048 BASIC METABOLIC PNL TOTAL CA: CPT

## 2022-11-20 PROCEDURE — 99284 EMERGENCY DEPT VISIT MOD MDM: CPT

## 2022-11-20 PROCEDURE — 6370000000 HC RX 637 (ALT 250 FOR IP): Performed by: EMERGENCY MEDICINE

## 2022-11-20 PROCEDURE — 36415 COLL VENOUS BLD VENIPUNCTURE: CPT

## 2022-11-20 PROCEDURE — 70450 CT HEAD/BRAIN W/O DYE: CPT

## 2022-11-20 RX ORDER — MECLIZINE HCL 12.5 MG/1
12.5 TABLET ORAL 3 TIMES DAILY PRN
Qty: 15 TABLET | Refills: 0 | Status: SHIPPED | OUTPATIENT
Start: 2022-11-20 | End: 2022-11-30

## 2022-11-20 RX ORDER — MECLIZINE HCL 12.5 MG/1
25 TABLET ORAL ONCE
Status: COMPLETED | OUTPATIENT
Start: 2022-11-20 | End: 2022-11-20

## 2022-11-20 RX ADMIN — MECLIZINE 25 MG: 12.5 TABLET ORAL at 20:20

## 2022-11-20 ASSESSMENT — PAIN - FUNCTIONAL ASSESSMENT: PAIN_FUNCTIONAL_ASSESSMENT: NONE - DENIES PAIN

## 2022-11-21 NOTE — ED PROVIDER NOTES
eMERGENCY dEPARTMENT eNCOUnter      Pt Name: Morgan Beltran  MRN: [id number]  [place] 1965  Date of evaluation: 11/20/2022      CHIEF COMPLAINT       Chief Complaint   Patient presents with    Dizziness     Pt states when she closes her eyes the room feels like it's spinning. Nauseated. Feels like her head is numb and she is easily distracted. HISTORY OF PRESENT ILLNESS    Morgan Beltran is a 56 y.o. female who presents with dizziness. Patient states that when she closes her eyes feels like her head is spinning she complains of some numbness to actually the top of her head but no place else she denies any blurry vision double vision she denies any recent illnesses she believes that this is secondary to the medication that they have changed no abdominal pain chest pain or shortness of breath        REVIEW OF SYSTEMS       Review of systems are all reviewed and negative except stated above in HPI    [address]    has a past medical history of Asthma, Atrial fibrillation ([address] Cerebral artery occlusion with cerebral infarction ([address] CHF (congestive heart failure) ([address] Diabetes mellitus ([address] Hyperlipidemia, and Hypertension. SURGICAL HISTORY      has a past surgical history that includes Cardiac surgery and pacemaker placement (07/08/2014).     CURRENT MEDICATIONS       Discharge Medication List as of 11/20/2022  9:52 PM        CONTINUE these medications which have NOT CHANGED    Details   spironolactone (ALDACTONE) 25 MG tablet Take 25 mg by mouth dailyHistorical Med      atorvastatin (LIPITOR) 40 MG tablet Take 40 mg by mouth dailyHistorical Med      metoprolol succinate (TOPROL XL) 100 MG extended release tablet Take 100 mg by mouth dailyHistorical Med      Multiple Vitamins-Minerals (THERAPEUTIC MULTIVITAMIN-MINERALS) tablet Take 1 tablet by mouth dailyHistorical Med      aspirin 325 MG tablet Take by mouthHistorical Med      cyanocobalamin ([employer] VITAMIN B12) 1000 MCG tablet Take 1 tablet by mouth dailyHistorical Med      ranolazine (RANEXA) 500 MG extended release tablet Take 500 mg by mouth 2 times dailyHistorical Med      albuterol (PROVENTIL) (2.5 MG/3ML) 0.083% nebulizer solution Inhale 2.5 mg into the lungs every 4 hoursHistorical Med      albuterol sulfate HFA (VENTOLIN HFA) 108 (90 Base) MCG/ACT inhaler Inhale 2 puffs into the lungs daily as neededHistorical Med      digoxin (DIGITEK) 250 MCG tablet Take 1 tablet by mouth dailyHistorical Med      vitamin D (ERGOCALCIFEROL) 1.25 MG (84748 UT) CAPS capsule Take 1 capsule by mouth every 7 daysHistorical Med      fluticasone-salmeterol (ADVAIR) 250-50 MCG/DOSE AEPB Inhale 2 puffs into the lungs 2 times dailyHistorical Med      furosemide (LASIX) 80 MG tablet Take 1 tablet by mouth dailyHistorical Med      gabapentin (NEURONTIN) 300 MG capsule Take 300 mg by mouth daily. Historical Med      glipiZIDE (GLUCOTROL XL) 10 MG extended release tablet Take 1 tablet by mouth dailyHistorical Med      isosorbide mononitrate (IMDUR) 120 MG extended release tablet Take 120 mg by mouth dailyHistorical Med      ketoconazole (NIZORAL) 2 % cream Apply topically 2 times daily, Topical, 2 TIMES DAILY Starting Wed 4/3/2019, Historical Med      metFORMIN (GLUCOPHAGE) 1000 MG tablet Take 1,000 mg by mouth 2 times dailyHistorical Med      montelukast (SINGULAIR) 10 MG tablet Take 10 mg by mouth nightlyHistorical Med      mupirocin (BACTROBAN) 2 % ointment Apply topically 2 times daily, Topical, 2 TIMES DAILY Starting Wed 4/3/2019, Historical Med      nitroGLYCERIN (NITROSTAT) 0.4 MG SL tablet Place 0.4 mg under the tongue every 5 minutes as neededHistorical Med      omeprazole (PRILOSEC) 40 MG delayed release capsule Take 40 mg by mouth 2 times dailyHistorical Med      potassium chloride (KLOR-CON M) 20 MEQ extended release tablet Take 20 mEq by mouth dailyHistorical Med      rivaroxaban (XARELTO) 20 MG TABS tablet Take 20 mg by mouth Daily with supperHistorical Med      rOPINIRole (REQUIP) 0.25 MG tablet Take 0.25 mg by mouth nightlyHistorical Med      sacubitril-valsartan (ENTRESTO)  MG per tablet Take 1 tablet by mouth dailyHistorical Med      Magnesium Oxide 400 MG CAPS Take by mouth 2 times dailyHistorical Med             ALLERGIES     is allergic to aggrenox [aspirin-dipyridamole er] and keflex [cephalexin]. FAMILY HISTORY     has no family status information on file. family history is not on file. SOCIAL HISTORY      reports that she has never smoked. She has never used smokeless tobacco. She reports that she does not drink alcohol and does not use drugs. PHYSICAL EXAM     INITIAL VITALS:  height is 4' 9\" (1.448 m) and weight is 150 lb (68 kg). Her tympanic temperature is 97.9 °F (36.6 °C). Her blood pressure is 172/96 (abnormal) and her pulse is 98. Her respiration is 16 and oxygen saturation is 100%. General: Patient alert nontoxic-appearing female in no apparent distress  HEENT: Head is atraumatic conjunctive are clear pupils are equal reactive she has a slight nystagmus with left gaze but she has no increasing symptoms  Neck: Supple  Respiratory: Lung sounds are clear bilateral  Cardiac: Heart is regular rate and rhythm  GI: Abdomen soft nontender  Neuro: Patient has no gross focal neurological deficits at bedside exam cranial nerves II 12 intact grossly sensation and strength are within normal in upper lower extremities bilateral she ambulates without ataxia    DIFFERENTIAL DIAGNOSIS/ MDM:     Obtain CAT scan and baseline labs    DIAGNOSTIC RESULTS     EKG: All EKG's are interpreted by the Emergency Department Physician who either signs or Co-signs this chart in the absence of a cardiologist.        RADIOLOGY:   I directly visualized the following  images and reviewed the radiologist interpretations:  CT HEAD WO CONTRAST   Final Result   No acute intracranial abnormality.                LABS:  Labs Reviewed   BASIC METABOLIC PANEL - Abnormal; Notable for the following components:       Result Value    Glucose 107 (*)     Bun/Cre Ratio 29 (*)     Calcium 10.6 (*)     All other components within normal limits   CBC WITH AUTO DIFFERENTIAL         EMERGENCY DEPARTMENT COURSE:   Vitals:    Vitals:    11/20/22 1955 11/20/22 2000   BP: (!) 186/105 (!) 172/96   Pulse: 98    Resp: 16    Temp: 97.9 °F (36.6 °C)    TempSrc: Tympanic    SpO2: 100% 100%   Weight: 150 lb (68 kg)    Height: 4' 9\" (1.448 m)      -------------------------  BP: (!) 172/96, Temp: 97.9 °F (36.6 °C), Heart Rate: 98, Resp: 16    Orders Placed This Encounter   Medications    meclizine (ANTIVERT) tablet 25 mg    meclizine (ANTIVERT) 12.5 MG tablet     Sig: Take 1 tablet by mouth 3 times daily as needed for Dizziness     Dispense:  15 tablet     Refill:  0           Re-evaluation Notes    She was given Antivert here CAT scan per radiology shows nothing acute labs are unremarkable at this time there is no indication of acute neurological event such as CVA we will send her home on Antivert and have her follow-up with her primary return if worse    CRITICAL CARE:   None      CONSULTS:      PROCEDURES:  None    FINAL IMPRESSION      1. Dizziness          DISPOSITION/PLAN   DISPOSITION Decision To Discharge 11/20/2022 09:51:01 PM      Condition on Disposition    Stable    PATIENT REFERRED TO:  Krissy Lezama MD  [address] [id number] [phone]    In 1 day        DISCHARGE MEDICATIONS:  Discharge Medication List as of 11/20/2022  9:52 PM        START taking these medications    Details   meclizine (ANTIVERT) 12.5 MG tablet Take 1 tablet by mouth 3 times daily as needed for Dizziness, Disp-15 tablet, R-0Normal             (Please note that portions of this note were completed with a voice recognition program.  Efforts were made to edit the dictations but occasionally words are mis-transcribed.)    Gilbert Myles MD,, MD, F.A.C.E.P.   Attending Emergency Physician        Waqar Coleman MD  11/21/22 [id number]